# Patient Record
Sex: MALE | Race: OTHER | NOT HISPANIC OR LATINO | Employment: PART TIME | URBAN - METROPOLITAN AREA
[De-identification: names, ages, dates, MRNs, and addresses within clinical notes are randomized per-mention and may not be internally consistent; named-entity substitution may affect disease eponyms.]

---

## 2018-12-14 ENCOUNTER — HOSPITAL ENCOUNTER (EMERGENCY)
Facility: HOSPITAL | Age: 28
Discharge: HOME | End: 2018-12-15
Attending: EMERGENCY MEDICINE | Admitting: EMERGENCY MEDICINE

## 2018-12-14 VITALS
DIASTOLIC BLOOD PRESSURE: 92 MMHG | WEIGHT: 144.4 LBS | RESPIRATION RATE: 20 BRPM | HEART RATE: 115 BPM | TEMPERATURE: 97.8 F | OXYGEN SATURATION: 98 % | SYSTOLIC BLOOD PRESSURE: 151 MMHG

## 2018-12-14 DIAGNOSIS — H16.001 CORNEAL ULCERATION, RIGHT: Primary | ICD-10-CM

## 2018-12-14 DIAGNOSIS — H20.00 ACUTE IRITIS OF RIGHT EYE: ICD-10-CM

## 2018-12-14 PROCEDURE — 99282 EMERGENCY DEPT VISIT SF MDM: CPT

## 2018-12-14 RX ORDER — TETRACAINE HYDROCHLORIDE 5 MG/ML
1 SOLUTION OPHTHALMIC ONCE
Status: COMPLETED | OUTPATIENT
Start: 2018-12-14 | End: 2018-12-14

## 2018-12-14 RX ADMIN — FLUORESCEIN SODIUM 1 STRIP: 0.6 STRIP OPHTHALMIC at 22:45

## 2018-12-14 RX ADMIN — TETRACAINE HYDROCHLORIDE 1 DROP: 5 SOLUTION OPHTHALMIC at 22:45

## 2018-12-15 RX ORDER — OFLOXACIN 3 MG/ML
1 SOLUTION/ DROPS OPHTHALMIC
Qty: 5 ML | Refills: 0 | Status: SHIPPED | OUTPATIENT
Start: 2018-12-15 | End: 2018-12-21

## 2018-12-15 NOTE — ED ATTENDING ATTESTATION
Tamie Morrell MD, saw and evaluated the patient  I have discussed the patient with the resident/non-physician practitioner and agree with the resident's/non-physician practitioner's findings, Plan of Care, and MDM as documented in the resident's/non-physician practitioner's note, except where noted  All available labs and Radiology studies were reviewed  At this point I agree with the current assessment done in the Emergency Department  I have conducted an independent evaluation of this patient a history and physical is as follows:  Patient is a 30 yo male, comes with c/o right eye redness, wears contacts, slept last night with contacts on, woke up with pain, and redness in right eye, contact fell off on bed; associated with tearing  On exam, no acute distress, VSS, PERRL Right appears red, injected, Fluorescein staining shows possible corneal abrasion, iritis noted  PA will contact Ophtho, would need Antibiotic eye drops, pain meds, further evaluation as per Ophtho, close follow up, RTED for worsening symptoms      Critical Care Time  CritCare Time    Procedures

## 2018-12-15 NOTE — ED PROVIDER NOTES
History  Chief Complaint   Patient presents with    Eye Redness     Pt reports he woke up with right eye redness and "feels like some is scratching, i fell asleep with my contact in " Pt states he removed both contacts but right eye is red, painful, and reports drainage  27-year-old male presents the ER with right eye redness, irritation, photophobia, pain with movement  Patient states that he slept with his contacts in 1 night and when he woke the contact was on the pillow next to him and his eye was red and irritated  Patient states that he noticed worsening symptoms throughout the day including sensitivity to light, tearing, redness to the eye  Patient denies any eyelid swelling or discharge  Patient states that he normally goes to a contact lens center for his contact lenses  Patient denies any double vision  Patient states that he noted an area of fuzziness directly in his line of vision  Patient states that he was going his symptoms and is concerned that he will go blind  None       Past Medical History:   Diagnosis Date    Asthma        History reviewed  No pertinent surgical history  History reviewed  No pertinent family history  I have reviewed and agree with the history as documented  Social History   Substance Use Topics    Smoking status: Never Smoker    Smokeless tobacco: Not on file    Alcohol use Yes      Comment: socially         Review of Systems   Constitutional: Negative for chills and fever  Eyes: Positive for photophobia, pain, discharge (Tearing), redness and visual disturbance  Negative for itching  Respiratory: Negative for chest tightness, shortness of breath and wheezing  Cardiovascular: Negative for chest pain and palpitations  Gastrointestinal: Negative for abdominal pain, nausea and vomiting  Skin: Negative for rash  Neurological: Negative for dizziness, weakness, light-headedness, numbness and headaches     All other systems reviewed and are negative  Physical Exam  Physical Exam   Constitutional: He appears well-developed and well-nourished  He appears distressed  HENT:   Head: Normocephalic and atraumatic  Nose: Nose normal    Mouth/Throat: Uvula is midline, oropharynx is clear and moist and mucous membranes are normal    Eyes: Pupils are equal, round, and reactive to light  EOM and lids are normal  Right eye exhibits no discharge and no hordeolum  No foreign body present in the right eye  Right conjunctiva is injected  Slit lamp exam:       The right eye shows corneal ulcer  The right eye shows no corneal abrasion  Photophobia noted to right eye during exam    Neck: Normal range of motion  Cardiovascular: Regular rhythm and intact distal pulses  Tachycardia present  Pulmonary/Chest: Effort normal and breath sounds normal    Musculoskeletal: Normal range of motion  Neurological: He is alert  Skin: Skin is warm and dry  Capillary refill takes less than 2 seconds  No rash noted  He is not diaphoretic  No erythema  Nursing note and vitals reviewed  Vital Signs  ED Triage Vitals [12/14/18 2129]   Temperature Pulse Respirations Blood Pressure SpO2   97 8 °F (36 6 °C) (!) 115 20 151/92 98 %      Temp Source Heart Rate Source Patient Position - Orthostatic VS BP Location FiO2 (%)   Oral Monitor -- Right arm --      Pain Score       --           Vitals:    12/14/18 2129   BP: 151/92   Pulse: (!) 115       Visual Acuity  Visual Acuity      Most Recent Value   Visual acuity R eye is  20/25   Visual acuity Left eye is  20/20   Visual acuity in both eyes is  20/20   Wearing corrective eyewear/lenses?   Yes          ED Medications  Medications   fluorescein sodium sterile ophthalmic strip 1 strip (1 strip Right Eye Given 12/14/18 2245)   tetracaine 0 5 % ophthalmic solution 1 drop (1 drop Right Eye Given 12/14/18 2245)       Diagnostic Studies  Results Reviewed     None                 No orders to display Procedures  Procedures       Phone Contacts  ED Phone Contact    ED Course  ED Course as of Dec 24 1057   Fri Dec 14, 2018   [address] [facility] and on-call doctor Vanessa Cobb is being paged  MDM  Number of Diagnoses or Management Options  Acute iritis of right eye: new and does not require workup  Corneal ulceration, right: new and does not require workup  Diagnosis management comments: Discussed with Dr Keyana Simpson who also examined the pt and agreed with diagnosis  Called and spoke with on call doctor at the [facility] and he agreed that the pt should be placed on Ofloxacin drop Q1H while awake and pt should call the office on Monday morning to schedule a recheck  Pt information was sent by the RN  Discussed with patient that he should follow up on Monday morning and also gave patient strict return to ER instructions for any new or worsening symptoms  Patient states he understands instructions and will follow up Monday morning  Patient Progress  Patient progress: stable    CritCare Time    Disposition  Final diagnoses:   Corneal ulceration, right   Acute iritis of right eye     Time reflects when diagnosis was documented in both MDM as applicable and the Disposition within this note     Time User Action Codes Description Comment    12/15/2018 12:07 AM Elizabeth Nicolas Add [A73 415] Corneal ulceration, right     12/15/2018 12:08 AM Elizabeth Nicolas Add [H20 00] Acute iritis of right eye       ED Disposition     ED Disposition Condition Comment    Discharge  Pepe Jenkins discharge to home/self care  Condition at discharge: Stable        Follow-up Information     Follow up With Specialties Details Why [address] Sight  Call in 3 days For further evaluation of symptoms [address]  [phone]          Discharge Medication List as of 12/15/2018 12:13 AM START taking these medications    Details   ofloxacin (OCUFLOX) 0 3 % ophthalmic solution Administer 1 drop to the right eye every hour while awake for 6 days, Starting Sat 12/15/2018, Until Fri 12/21/2018, Print           No discharge procedures on file      ED Provider  Electronically Signed by           Renny Hill PA-C  12/24/18 1051

## 2018-12-15 NOTE — ED NOTES
Patient reports having worn contact lenses for 2 days continuously prior to falling asleep with them on       [address] [place] [address], RN  12/14/18 3557

## 2018-12-15 NOTE — DISCHARGE INSTRUCTIONS
Do not were contact lenses  Used drops every hour while awake  Do not rub your eyes  Can use your glasses until seen by [facility]  If any symptoms worsen or new symptoms developed return to the ER  Iritis   WHAT YOU NEED TO KNOW:   Iritis is inflammation of your iris  The iris is the colored part of your eye  DISCHARGE INSTRUCTIONS:   Follow up with your healthcare provider or ophthalmologist within 24 hours:  Write down your questions so you remember to ask them during your visits  Medicines:   · Cycloplegics: These eyedrops dilate your pupil and relax your eye muscles  This helps decrease pain and light sensitivity  · Steroids: These eyedrops help decrease pain and inflammation  · Acetaminophen: This medicine decreases pain  You can buy acetaminophen without a doctor's order  Ask how much to take and how often to take it  Follow directions  Acetaminophen can cause liver damage if not taken correctly  · Take your medicine as directed  Contact your healthcare provider if you think your medicine is not helping or if you have side effects  Tell him of her if you are allergic to any medicine  Keep a list of the medicines, vitamins, and herbs you take  Include the amounts, and when and why you take them  Bring the list or the pill bottles to follow-up visits  Carry your medicine list with you in case of an emergency  Manage your symptoms:   · Apply a warm compress to your eye:  Wet a washcloth in warm water and wring it out  Place it gently over your eye for 20 minutes 3 to 4 times each day  This will help soothe your eye and decrease inflammation  · Wear dark sunglasses: This will help prevent pain and light sensitivity  Contact your healthcare provider or ophthalmologist if:   · Your pain gets worse, even after treatment  · You see halos or rainbows around lights  · You have questions or concerns about your condition or care    Return to the emergency department if:   · You have severe eye pain and a headache  · Your vision suddenly gets worse  · You have nausea or vomiting  © 2017 [address] [employer] Information is for End User's use only and may not be sold, redistributed or otherwise used for commercial purposes  All illustrations and images included in CareNotes® are the copyrighted property of A D A M , Inc  or [employer] M [employer]  The above information is an  only  It is not intended as medical advice for individual conditions or treatments  Talk to your doctor, nurse or pharmacist before following any medical regimen to see if it is safe and effective for you  Corneal Ulcer   WHAT YOU NEED TO KNOW:   A corneal ulcer is an open sore on your cornea  The cornea is the smooth, clear outer layer of your eye  A corneal ulcer is caused by bacteria that get into your eye, such as through a scratch  DISCHARGE INSTRUCTIONS:   Medicines:   · NSAIDs:  These medicines decrease swelling, pain, and fever  NSAIDs are available without a doctor's order  Ask your healthcare provider which medicine is right for you  Ask how much to take and when to take it  Take as directed  NSAIDs can cause stomach bleeding and kidney problems if not taken correctly  · Antibiotic eye medicine: This is given to treat an infection caused by bacteria  It may be in eyedrops or an ointment  · Cycloplegic eye medicine: This will dilate your pupil and relax your eye muscles, which will decrease your pain  · Pain medicines: You may be given prescription medicine to take away or decrease pain  Do not wait until the pain is severe before you take your medicine  · Take your medicine as directed  Contact your healthcare provider if you think your medicine is not helping or if you have side effects  Tell him of her if you are allergic to any medicine  Keep a list of the medicines, vitamins, and herbs you take   Include the amounts, and when and why you take them  Bring the list or the pill bottles to follow-up visits  Carry your medicine list with you in case of an emergency  Follow up with your healthcare provider or eye specialist within 24 hours: You may need to see your eye specialist every 1 to 3 days if your condition is severe  Write down your questions so you remember to ask them during your visits  Manage your symptoms:   · Apply a warm compress:  Wet a washcloth with warm water and place it on your eye  This will help decrease swelling and pain  Use as often as directed  · Clean around your eye:  Gently remove any crusty buildup around your eye  · Use eyedrops: This will keep your eyes moist and help decrease pain  · Use safety equipment:  Wear sunglasses or safety goggles to avoid another injury  · Ask about your contacts:  Do not wear contact lenses until your healthcare provider says it is okay  Always clean your contact lenses with proper contact   Contact your healthcare provider or eye specialist if:   · Your vision gets worse  · Your symptoms do not improve with treatment  · You have questions or concerns about your condition or care  Return to the emergency department if:   · You have severe eye pain  · You lose your vision  · You think your corneal ulcer is getting bigger  · You injure your eye again  © 2017 [address]  Information is for End User's use only and may not be sold, redistributed or otherwise used for commercial purposes  All illustrations and images included in CareNotes® are the copyrighted property of [employer]  or Luis M Dewitt  The above information is an  only  It is not intended as medical advice for individual conditions or treatments  Talk to your doctor, nurse or pharmacist before following any medical regimen to see if it is safe and effective for you

## 2024-05-07 ENCOUNTER — APPOINTMENT (EMERGENCY)
Dept: RADIOLOGY | Facility: HOSPITAL | Age: 34
End: 2024-05-07

## 2024-05-07 ENCOUNTER — HOSPITAL ENCOUNTER (EMERGENCY)
Facility: HOSPITAL | Age: 34
Discharge: HOME | End: 2024-05-07
Attending: EMERGENCY MEDICINE

## 2024-05-07 VITALS
OXYGEN SATURATION: 98 % | HEART RATE: 102 BPM | WEIGHT: 144.4 LBS | TEMPERATURE: 99.3 F | RESPIRATION RATE: 18 BRPM | DIASTOLIC BLOOD PRESSURE: 75 MMHG | SYSTOLIC BLOOD PRESSURE: 130 MMHG

## 2024-05-07 DIAGNOSIS — M79.671 RIGHT FOOT PAIN: ICD-10-CM

## 2024-05-07 DIAGNOSIS — M54.31 SCIATICA, RIGHT SIDE: ICD-10-CM

## 2024-05-07 DIAGNOSIS — G57.01 PYRIFORMIS SYNDROME, RIGHT: Primary | ICD-10-CM

## 2024-05-07 PROCEDURE — 96372 THER/PROPH/DIAG INJ SC/IM: CPT

## 2024-05-07 PROCEDURE — 73630 X-RAY EXAM OF FOOT: CPT

## 2024-05-07 PROCEDURE — 99284 EMERGENCY DEPT VISIT MOD MDM: CPT | Performed by: EMERGENCY MEDICINE

## 2024-05-07 PROCEDURE — 99283 EMERGENCY DEPT VISIT LOW MDM: CPT

## 2024-05-07 RX ORDER — LIDOCAINE 50 MG/G
1 PATCH TOPICAL ONCE
Status: DISCONTINUED | OUTPATIENT
Start: 2024-05-07 | End: 2024-05-07 | Stop reason: HOSPADM

## 2024-05-07 RX ORDER — NAPROXEN 250 MG/1
250 TABLET ORAL
Qty: 21 TABLET | Refills: 0 | Status: SHIPPED | OUTPATIENT
Start: 2024-05-07 | End: 2024-05-14

## 2024-05-07 RX ORDER — METHOCARBAMOL 750 MG/1
750 TABLET, FILM COATED ORAL ONCE
Status: COMPLETED | OUTPATIENT
Start: 2024-05-07 | End: 2024-05-07

## 2024-05-07 RX ORDER — KETOROLAC TROMETHAMINE 30 MG/ML
15 INJECTION, SOLUTION INTRAMUSCULAR; INTRAVENOUS ONCE
Status: COMPLETED | OUTPATIENT
Start: 2024-05-07 | End: 2024-05-07

## 2024-05-07 RX ORDER — LIDOCAINE 40 MG/G
CREAM TOPICAL AS NEEDED
Qty: 30 G | Refills: 0 | Status: SHIPPED | OUTPATIENT
Start: 2024-05-07

## 2024-05-07 RX ORDER — METHOCARBAMOL 500 MG/1
500 TABLET, FILM COATED ORAL 2 TIMES DAILY
Qty: 20 TABLET | Refills: 0 | Status: SHIPPED | OUTPATIENT
Start: 2024-05-07

## 2024-05-07 RX ORDER — METHYLPREDNISOLONE 4 MG/1
TABLET ORAL
Qty: 21 TABLET | Refills: 0 | Status: SHIPPED | OUTPATIENT
Start: 2024-05-07

## 2024-05-07 RX ADMIN — METHOCARBAMOL 750 MG: 750 TABLET ORAL at 20:50

## 2024-05-07 RX ADMIN — KETOROLAC TROMETHAMINE 15 MG: 30 INJECTION, SOLUTION INTRAMUSCULAR; INTRAVENOUS at 20:50

## 2024-05-07 RX ADMIN — LIDOCAINE 5% 1 PATCH: 700 PATCH TOPICAL at 20:50

## 2024-05-07 NOTE — Clinical Note
Pepe Jenkins was seen and treated in our emergency department on 5/7/2024.    No restrictions            Diagnosis:     Pepe  may return to work on return date.    He may return on this date: 05/09/2024         If you have any questions or concerns, please don't hesitate to call.      Sriram Ocasio MD    ______________________________           _______________          _______________  Hospital Representative                              Date                                Time

## 2024-05-08 ENCOUNTER — TELEPHONE (OUTPATIENT)
Age: 34
End: 2024-05-08

## 2024-05-08 NOTE — ED PROVIDER NOTES
History  Chief Complaint   Patient presents with    Back Pain     Pt reports right-sided lower back pain with shooting pains down leg. Also reports burning sensation in R foot and difficulty with ambulation. Been going on approx 3-4 weeks. Denies known injury. Reports does a lot of moving around at work.     Foot Pain     Burning sensation in R foot with increased pain when putting pressure on it to walk.     33-year-old male presents for evaluation of 2 and half weeks pain in his right buttocks that radiates into his right foot.  Pain is constant, worse with positional changes, sitting for too long.  Associated with a burning sensation in the distal portion of his right foot and right great toe.  That is been present for 4 weeks.  No recent falls or back trauma, fevers, chills, history of IV drug use, abdominal pain, bowel/bladder complaints, saddle anesthesia.      History provided by:  Patient  Back Pain  Associated symptoms: no abdominal pain, no chest pain, no dysuria, no fever, no numbness and no weakness        None       Past Medical History:   Diagnosis Date    Asthma        History reviewed. No pertinent surgical history.    History reviewed. No pertinent family history.  I have reviewed and agree with the history as documented.    E-Cigarette/Vaping     E-Cigarette/Vaping Substances    Nicotine No     THC No     CBD No     Flavoring No     Other No     Unknown No      Social History     Tobacco Use    Smoking status: Never   Substance Use Topics    Alcohol use: Yes     Comment: socially     Drug use: Yes     Types: Marijuana       Review of Systems   Constitutional:  Negative for activity change, appetite change, fatigue and fever.   HENT:  Negative for congestion, dental problem, ear pain, rhinorrhea and sore throat.    Eyes:  Negative for pain and redness.   Respiratory:  Negative for chest tightness, shortness of breath and wheezing.    Cardiovascular:  Negative for chest pain and palpitations.    Gastrointestinal:  Negative for abdominal pain, blood in stool, constipation, diarrhea, nausea and vomiting.   Endocrine: Negative for cold intolerance and heat intolerance.   Genitourinary:  Negative for dysuria, frequency and hematuria.   Musculoskeletal:  Positive for back pain. Negative for arthralgias and myalgias.   Skin:  Negative for color change, pallor and rash.   Neurological:  Negative for weakness and numbness.   Hematological:  Does not bruise/bleed easily.   Psychiatric/Behavioral:  Negative for agitation, hallucinations and suicidal ideas.        Physical Exam  Physical Exam  Vitals and nursing note reviewed.   Constitutional:       Appearance: He is well-developed.   HENT:      Mouth/Throat:      Pharynx: No oropharyngeal exudate.   Eyes:      Conjunctiva/sclera: Conjunctivae normal.   Neck:      Comments: No meningeal signs  Cardiovascular:      Rate and Rhythm: Normal rate and regular rhythm.      Heart sounds: Normal heart sounds.   Pulmonary:      Effort: Pulmonary effort is normal. No respiratory distress.      Breath sounds: Normal breath sounds. No wheezing or rales.   Chest:      Chest wall: No tenderness.   Abdominal:      General: Bowel sounds are normal. There is no distension.      Palpations: Abdomen is soft. There is no mass.      Tenderness: There is no abdominal tenderness.      Hernia: No hernia is present.      Comments: No cvat   Musculoskeletal:         General: Normal range of motion.      Cervical back: Normal range of motion and neck supple.      Comments: Nttp over ctl spines, +ttp R pyriformis, +SLR RLE, NVI b/l le, R foot with out swelling, redness, warmth, +ttp RIght first metatarsal phalageal joint   Lymphadenopathy:      Cervical: No cervical adenopathy.   Skin:     Findings: No erythema or rash.      Comments: No edema   Neurological:      Mental Status: He is alert and oriented to person, place, and time.      Cranial Nerves: No cranial nerve deficit.   Psychiatric:          Behavior: Behavior normal.         Vital Signs  ED Triage Vitals   Temperature Pulse Respirations Blood Pressure SpO2   05/07/24 1936 05/07/24 1936 05/07/24 1936 05/07/24 1936 05/07/24 1936   99.3 °F (37.4 °C) (!) 121 18 130/75 98 %      Temp Source Heart Rate Source Patient Position - Orthostatic VS BP Location FiO2 (%)   05/07/24 1936 05/07/24 1936 05/07/24 1936 05/07/24 1936 --   Oral Monitor Sitting Right arm       Pain Score       05/07/24 2050       9           Vitals:    05/07/24 1936 05/07/24 2129   BP: 130/75    Pulse: (!) 121 102   Patient Position - Orthostatic VS: Sitting          Visual Acuity      ED Medications  Medications   lidocaine (LIDODERM) 5 % patch 1 patch (1 patch Topical Medication Applied 5/7/24 2050)   ketorolac (TORADOL) injection 15 mg (15 mg Intramuscular Given 5/7/24 2050)   methocarbamol (ROBAXIN) tablet 750 mg (750 mg Oral Given 5/7/24 2050)       Diagnostic Studies  Results Reviewed       None                   XR foot 3+ views RIGHT   ED Interpretation by Sriram Ocasio MD (05/07 2129)   Primary reviewed: no acute abnormality                 Procedures  Procedures         ED Course  ED Course as of 05/07/24 2131   Tue May 07, 2024   2129 Xray neg for acute fracture, will tx for sciatia, pcp/pods referral for R foot pain                                 SBIRT 20yo+      Flowsheet Row Most Recent Value   Initial Alcohol Screen: US AUDIT-C     1. How often do you have a drink containing alcohol? 0 Filed at: 05/07/2024 1957   2. How many drinks containing alcohol do you have on a typical day you are drinking?  0 Filed at: 05/07/2024 1957   3a. Male UNDER 65: How often do you have five or more drinks on one occasion? 0 Filed at: 05/07/2024 1957   Audit-C Score 0 Filed at: 05/07/2024 1957   ADRIANA: How many times in the past year have you...    Used an illegal drug or used a prescription medication for non-medical reasons? Never Filed at: 05/07/2024 1957                       Medical Decision Making  R Foot pain w/o hx exam findings to suggest infecious etiology/gout, will do xray to r/o fx/dislocation, tx symptoms    R sided low back pain radiating down the R leg w/o hx/exam findings to suggest infectious etiology/cauda equina and medical work up is not inidcatied likely pyriformis syndrome w/ sciatica, will reassure, , tx symptoms     Amount and/or Complexity of Data Reviewed  Radiology: ordered and independent interpretation performed.    Risk  OTC drugs.  Prescription drug management.             Disposition  Final diagnoses:   Pyriformis syndrome, right   Sciatica, right side   Right foot pain     Time reflects when diagnosis was documented in both MDM as applicable and the Disposition within this note       Time User Action Codes Description Comment    5/7/2024  9:30 PM Sriram Ocasio [G57.01] Pyriformis syndrome, right     5/7/2024  9:30 PM Sriram Ocasio [M54.31] Sciatica, right side     5/7/2024  9:30 PM Sriram Ocasio Add [M79.671] Right foot pain           ED Disposition       ED Disposition   Discharge    Condition   Stable    Date/Time   Tue May 7, 2024 2129    Comment   Pepe Jenkins discharge to home/self care.                   Follow-up Information       Follow up With Specialties Details Why Contact Info Additional Information    [facility] Schedule an appointment as soon as possible for a visit in 2 days  [address] [place] [address] [phone] [facility], [address] [place] [address], [address] [phone]    [facility] Schedule an appointment as soon as possible for a visit in 2 days  [address] [id number] [phone] [facility] [place], [facility] [address], [place], [address] [phone]             Patient's Medications   Discharge Prescriptions    LIDOCAINE (LMX) 4 % CREAM    Apply topically as needed for moderate pain       Start Date: 5/7/2024  End Date: --       Order Dose: --       Quantity: 30 g    Refills: 0    METHOCARBAMOL (ROBAXIN) 500 MG TABLET    Take 1 tablet (500 mg total) by mouth 2 (two) times a day       Start Date: 5/7/2024  End Date: --       Order Dose: 500 mg       Quantity: 20 tablet    Refills: 0    METHYLPREDNISOLONE 4 MG TABLET THERAPY PACK    Use as directed on package       Start Date: 5/7/2024  End Date: --       Order Dose: --       Quantity: 21 tablet    Refills: 0    NAPROXEN (NAPROSYN) 250 MG TABLET    Take 1 tablet (250 mg total) by mouth 3 (three) times a day with meals for 7 days       Start Date: 5/7/2024  End Date: 5/14/2024       Order Dose: 250 mg       Quantity: 21 tablet    Refills: 0       No discharge procedures on file.    PDMP Review       None            ED Provider  Electronically Signed by             Sriram Ocasio MD  05/07/24 3353

## 2024-05-08 NOTE — TELEPHONE ENCOUNTER
Caller: Pepe Jenkins    Doctor: Jacob    Reason for call: Patient is not able to put any weight on  his right great toe and the pain goes up his leg.  Is there any way we can do a force on for him?  Thank you.     Call back#: [phone]

## 2024-05-09 ENCOUNTER — TELEPHONE (OUTPATIENT)
Dept: FAMILY MEDICINE CLINIC | Facility: CLINIC | Age: 34
End: 2024-05-09

## 2024-05-28 ENCOUNTER — TELEPHONE (OUTPATIENT)
Dept: FAMILY MEDICINE CLINIC | Facility: CLINIC | Age: 34
End: 2024-05-28

## 2024-05-28 NOTE — TELEPHONE ENCOUNTER
first attempt to contact patient. no answer left message to return my call on answering machine      Need to reschedule no show genie

## 2025-04-11 ENCOUNTER — TELEPHONE (OUTPATIENT)
Age: 35
End: 2025-04-11

## 2025-04-11 NOTE — TELEPHONE ENCOUNTER
Confirmed patient would need to get workman's comp information prior to scheduling appt.  Patient going to call back with information.

## 2025-04-14 ENCOUNTER — OFFICE VISIT (OUTPATIENT)
Dept: OBGYN CLINIC | Facility: MEDICAL CENTER | Age: 35
End: 2025-04-14
Payer: OTHER MISCELLANEOUS

## 2025-04-14 VITALS — HEIGHT: 70 IN | WEIGHT: 152 LBS | BODY MASS INDEX: 21.76 KG/M2

## 2025-04-14 DIAGNOSIS — M67.911 ROTATOR CUFF DYSFUNCTION, RIGHT: ICD-10-CM

## 2025-04-14 DIAGNOSIS — M25.511 ACUTE PAIN OF RIGHT SHOULDER: Primary | ICD-10-CM

## 2025-04-14 PROCEDURE — 99204 OFFICE O/P NEW MOD 45 MIN: CPT | Performed by: ORTHOPAEDIC SURGERY

## 2025-04-14 RX ORDER — PREDNISONE 10 MG/1
TABLET ORAL
Qty: 45 TABLET | Refills: 0 | Status: SHIPPED | OUTPATIENT
Start: 2025-04-14

## 2025-04-14 NOTE — LETTER
April 14, 2025     Patient: Pepe Jenkins  YOB: 1990  Date of Visit: 4/14/2025      To Whom it May Concern:    Pepe Jenkins is under my professional care. Pepe was seen in my office on 4/14/2025.  The patient may work sedentary duty only until follow up in 4 weeks.      If you have any questions or concerns, please don't hesitate to call.          Sincerely,          Juni Gloria MD        CC: No Recipients

## 2025-04-14 NOTE — PROGRESS NOTES
Orthopaedic Surgery - Office Note  Pepe Jenkins (34 y.o. male)   : 1990   MRN: [id number]  Encounter Date: 2025    Assessment / Plan     Partial supraspinatus tear, superior labral tear concer for frozen shoulder versus cervical strain with radiculopathy    We discussed similarities and differences between cervical radiculopathy versus rotator cuff/labral tear  The patient had exquisite tenderness to light touch on exam.   We discussed primary goa would be to decrease pain to achieve a proper physical exam in which would further guide care  Re-start physical therapy  Oral steroid taper prescribed   Work note:  The patient may work sedentary duty only until follow up in 4 weeks.    Follow-up:  No follow-ups on file.      Chief Complaint / Date of Onset  Right shoulder pain  Injury Mechanism / Date  Throwing item at work  Surgery / Date  None    History of Present Illness   Pepe Jenkins is a 34 y.o. male who presents for for initial evaluation of right shoulder.  He had previously seen  including right subacromial steroid injection with short-lived benefit.  He has had symptoms for about 3 months with injury at work.  He lifted an item and threw it into another area with onset of symptoms with audible popping sound.  Today he complains of neck pain, generalized anterior and posterior shoulder pain, axillary pain with proximal biceps pain, distal hand numbness and sensation of arm hanging from torso.  He rates his pain from 8-10/10.  Any movement or leaning over aggravates while rest alleviates.  He has used Naproxen, Aleve and ASA for pain.  He has done physical therapy with increase of symptoms.  He denies past shoulder injections or surgery.   He works at [employer].    Treatment Summary  Medications / Modalities  Naproxen  Bracing / Immobilization  None  Physical Therapy  Yes, with increase of pain  Injections  Right subacromial Genie HORNE  Prior Surgeries  None  Other  "Treatments  None    Employment / Current Status  [employer]     Sport / Organization / Current Status  N/A      Review of Systems  Pertinent items are noted in HPI.  All other systems were reviewed and are negative.      Physical Exam  Ht 5' 10\" (1.778 m)   Wt 68.9 kg (152 lb)   BMI 21.81 kg/m²   Cons: Appears well.  No apparent distress.  Psych: Alert. Oriented x3.  Mood and affect normal.  Eyes: PERRLA, EOMI  Resp: Normal effort.  No audible wheezing or stridor.  CV: Palpable pulse.  No discernable arrhythmia.  No LE edema.  Lymph:  No palpable cervical, axillary, or inguinal lymphadenopathy.  Skin: Warm.  No palpable masses.  No visible lesions.  Neuro: Normal muscle tone.  Normal and symmetric DTR's.     Right Shoulder Exam  Alignment / Posture: Normal shoulder posture.  Inspection: No swelling. No ecchymosis.  Palpation: Patient exquisitely tender to light touch.  No effusion.  ROM: Shoulder FF AROM 100 . Shoulder ER 30. Shoulder IR sacrum.  Strength: Able to actively flex & extend elbow.  Pain with resisted ER 4/5  Stability: No objective shoulder instability.  Tests: not tested  Neurovascular: Sensation intact in Ax/R/M/U nerve distributions. 2+ radial pulse.     Negative bilateral Spurlings      Studies Reviewed  Right shoulder MRI of 2/19/2025:  Superior labral lesion.  High-grade partial supraspinatus.      Procedures  No procedures today.    Medical, Surgical, Family, and Social History  The patient's medical history, family history, and social history, were reviewed and updated as appropriate.    Past Medical History:   Diagnosis Date    Asthma        History reviewed. No pertinent surgical history.    History reviewed. No pertinent family history.    Social History     Occupational History    Not on file   Tobacco Use    Smoking status: Never    Smokeless tobacco: Not on file   Vaping Use    Vaping status: Not on file   Substance and Sexual Activity    Alcohol use: Yes     Comment: socially "     Drug use: Yes     Types: Marijuana    Sexual activity: Not on file       Allergies   Allergen Reactions    Pork (Porcine) Protein - Food Allergy Itching    Shellfish-Derived Products - Food Allergy Itching         Current Outpatient Medications:     lidocaine (LMX) 4 % cream, Apply topically as needed for moderate pain, Disp: 30 g, Rfl: 0    methylPREDNISolone 4 MG tablet therapy pack, Use as directed on package, Disp: 21 tablet, Rfl: 0    naproxen (NAPROSYN) 250 mg tablet, Take 1 tablet (250 mg total) by mouth 3 (three) times a day with meals for 7 days, Disp: 21 tablet, Rfl: 0    methocarbamol (ROBAXIN) 500 mg tablet, Take 1 tablet (500 mg total) by mouth 2 (two) times a day (Patient not taking: Reported on 4/14/2025), Disp: 20 tablet, Rfl: 0      Jony Ramey MA    Scribe Attestation      I,:  Jony Ramey MA am acting as a scribe while in the presence of the attending physician.:       I,:  Juni Gloria MD personally performed the services described in this documentation    as scribed in my presence.:

## 2025-04-14 NOTE — LETTER
April 14, 2025     Patient: Pepe Jenkins  YOB: 1990  Date of Visit: 4/14/2025      To Whom it May Concern:    Pepe Jenkins is under my professional care. Pepe was seen in my office on 4/14/2025.  The patient should not work until follow up in about 4 weeks.      If you have any questions or concerns, please don't hesitate to call.          Sincerely,          Juni Gloria MD        CC: No Recipients

## 2025-04-15 ENCOUNTER — TELEPHONE (OUTPATIENT)
Dept: OBGYN CLINIC | Facility: HOSPITAL | Age: 35
End: 2025-04-15

## 2025-04-15 NOTE — TELEPHONE ENCOUNTER
Caller: Pepe    Doctor: Dr. Gloria/Joseph    Reason for call: Patient's job stating that his work note needs to be more detailed. Currently it says sedentary duty only until 4 week fu.    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      Please send copy to his [employer], so he can email it to his company. Please call patient he would also like to come in to  a physical copy of the note as well.     Call back#: [phone]

## 2025-04-16 NOTE — TELEPHONE ENCOUNTER
Please let the patient know that I did put an updated note with those restrictions that will be in place to his next visit.  It is in my chart and he can also come to pick it up.

## 2025-04-25 NOTE — TELEPHONE ENCOUNTER
Caller: Judie from [facility]     Doctor: Dr. Gloria     Reason for call: electronically faxed 4/14 OVN to [phone]     Call back#: [phone]

## 2025-04-30 ENCOUNTER — OFFICE VISIT (OUTPATIENT)
Dept: OBGYN CLINIC | Facility: OTHER | Age: 35
End: 2025-04-30
Payer: OTHER MISCELLANEOUS

## 2025-04-30 ENCOUNTER — TELEPHONE (OUTPATIENT)
Age: 35
End: 2025-04-30

## 2025-04-30 VITALS — HEIGHT: 70 IN | WEIGHT: 152 LBS | BODY MASS INDEX: 21.76 KG/M2

## 2025-04-30 DIAGNOSIS — M25.511 ACUTE PAIN OF RIGHT SHOULDER: Primary | ICD-10-CM

## 2025-04-30 PROCEDURE — 99213 OFFICE O/P EST LOW 20 MIN: CPT | Performed by: ORTHOPAEDIC SURGERY

## 2025-04-30 NOTE — TELEPHONE ENCOUNTER
Caller: Liana from [facility]    Doctor: Dr. Gloria    Reason for call: Liana calling stating that patient has been attending PT with them and patient is progressing OK with PT but she is concerned with positive sulcus sign and would like to discuss what the next steps would be with PT.  Liana can be reached at number below.      Call back#: [phone]

## 2025-04-30 NOTE — PROGRESS NOTES
Orthopaedic Surgery - Office Note  Pepe Jenkins (34 y.o. male)   : 1990   MRN: [id number]  Encounter Date: 2025    Assessment / Plan     Partial supraspinatus tear, superior labral tear concern for frozen shoulder versus cervical strain with radiculopathy    Due to the patient's feeling of instability with current limited exam due to pain we would like to obtain an MRI arthrogram of the patient's right shoulder to further evaluate for labral tearing.  Hold physical therapy at this time due to the patient's worsening pain and symptomology.  Continue with ice and analgesics/NSAIDs as needed for pain.  Work note: Continue with current work restrictions of sedentary duty at this time.    Follow-up:  Return for follow up after MRI arthrogram R shoulder with Dr. Gloria.      Chief Complaint / Date of Onset  Right shoulder pain  Injury Mechanism / Date  Throwing item at work  Surgery / Date  None    History of Present Illness   Pepe Jenkins is a 34 y.o. male following up for evaluation of his right shoulder.  Since last visit he has started physical therapy which has helped him improve some swelling and slightly his range of motion.  Since last visit and starting to move it more though he is had the feeling of instability with his shoulder slipping out the front.  He states that this is the same as a sensation he felt when the initial injury occurred.  After physical therapy he feels that his pain and swelling do increase for at least a day.  He has been on light duty at work which included sedentary work that did not involve his right arm. He had previously seen  including right subacromial steroid injection with short-lived benefit.  He has had symptoms for about 3 months with injury at work.  He lifted an item and threw it into another area with onset of symptoms with audible popping sound and a sensation of instability.  Today he continues to complain of generalized anterior and posterior  "shoulder pain.  He rates his pain from 8-10/10 with movement.  Any movement or leaning over aggravates while rest alleviates.  He has used Naproxen, Aleve and ASA for pain.  He has done physical therapy with increase of symptoms.  He denies past shoulder injections or surgery.   He works at [employer].    Treatment Summary  Medications / Modalities  Naproxen  Bracing / Immobilization  None  Physical Therapy  Yes, with increase of pain and feeling of instability.  Physical therapist did notice a sulcus sign on exam.  Injections  Right subacromial CS, Genie  Prior Surgeries  None  Other Treatments  None    Employment / Current Status  [employer]     Sport / Organization / Current Status  N/A      Review of Systems  Pertinent items are noted in HPI.  All other systems were reviewed and are negative.      Physical Exam  Ht 5' 10\" (1.778 m)   Wt 68.9 kg (152 lb)   BMI 21.81 kg/m²   Cons: Appears well.  No apparent distress.  Psych: Alert. Oriented x3.  Mood and affect normal.  Eyes: PERRLA, EOMI  Resp: Normal effort.  No audible wheezing or stridor.  CV: Palpable pulse.  No discernable arrhythmia.  No LE edema.  Lymph:  No palpable cervical, axillary, or inguinal lymphadenopathy.  Skin: Warm.  No palpable masses.  No visible lesions.  Neuro: Normal muscle tone.  Normal and symmetric DTR's.     Right Shoulder Exam  Alignment / Posture:  Normal cervical alignment. Normal shoulder posture. Normal scapular position.  Inspection:  No swelling. No muscle atrophy. No deformity.  Palpation:   Moderate tenderness at anterior aspect of the shoulder/subacromial space.  ROM:  Shoulder FE 70 with feeling of instability past this both actively and passively. Shoulder ER 60 with feeling of instability past this. Shoulder IR SI joint on the right side.  Strength:   We are unable to assess strength testing at this time due to the patient's pain and limited range of motion.  Stability:  (+) Apprehension. (+) " Relocation.  Tests: (-) Spurling.  Neurovascular:  Sensation intact in Ax/R/M/U nerve distributions. Sensation intact in all digital nerve distributions. Fingers warm and perfused.       Studies Reviewed  Right shoulder MRI of 2/19/2025:  Superior labral lesion.  High-grade partial supraspinatus.      Procedures  No procedures today.    Medical, Surgical, Family, and Social History  The patient's medical history, family history, and social history, were reviewed and updated as appropriate.    Past Medical History:   Diagnosis Date    Asthma        History reviewed. No pertinent surgical history.    History reviewed. No pertinent family history.    Social History     Occupational History    Not on file   Tobacco Use    Smoking status: Never    Smokeless tobacco: Not on file   Vaping Use    Vaping status: Not on file   Substance and Sexual Activity    Alcohol use: Yes     Comment: socially     Drug use: Yes     Types: Marijuana    Sexual activity: Not on file       Allergies   Allergen Reactions    Pork (Porcine) Protein - Food Allergy Itching    Shellfish-Derived Products - Food Allergy Itching         Current Outpatient Medications:     lidocaine (LMX) 4 % cream, Apply topically as needed for moderate pain, Disp: 30 g, Rfl: 0    methylPREDNISolone 4 MG tablet therapy pack, Use as directed on package, Disp: 21 tablet, Rfl: 0    methocarbamol (ROBAXIN) 500 mg tablet, Take 1 tablet (500 mg total) by mouth 2 (two) times a day (Patient not taking: Reported on 4/30/2025), Disp: 20 tablet, Rfl: 0    naproxen (NAPROSYN) 250 mg tablet, Take 1 tablet (250 mg total) by mouth 3 (three) times a day with meals for 7 days, Disp: 21 tablet, Rfl: 0    predniSONE 10 mg tablet, Take 5 pills/day for 3 days, 4 pills/day for 3 days, 3 pills/day for 3 days, 2 pills/day for 3 days, one pill/day for 3 days (Patient not taking: Reported on 4/30/2025), Disp: 45 tablet, Rfl: 0      Liam Almaguer PA-C    Scribmoon Attestation      I,:   am  acting as a scribe while in the presence of the attending physician.:       I,:   personally performed the services described in this documentation    as scribed in my presence.:

## 2025-04-30 NOTE — LETTER
April 30, 2025     Patient: Pepe Jenkins  YOB: 1990  Date of Visit: 4/30/2025      To Whom it May Concern:    Pepe Jenkins is under my professional care. Pepe was seen in my office on 4/30/2025. At this time we are recommending the patient continue on sedentary duty including no lifting, no reaching, no bending, no pushing or pulling, until he is seen back for reevaluation on 5/12/2025 or sooner if MRI arthrogram available at that time. At that time he will be reevaluated for updated restrictions. He currently is allowed to work with these restrictions for 8 hours a day.     If you have any questions or concerns, please don't hesitate to call.          Sincerely,          Juni Gloria MD        CC: No Recipients

## 2025-04-30 NOTE — TELEPHONE ENCOUNTER
I did contact Liana and tell her that we are holding physical therapy for him at this time until after the MRI arthrogram.

## 2025-05-08 NOTE — NURSING NOTE
Unable to reach patient, sent instructions and directions in e-mail that is linked to this account and via epic my chart. See message below.  Upcoming Radiology appointment at [facility]  Good afternoon Mr. Jenkins,    You have an upcoming appointment here at [facility] on 4/20/25 @ 1 pm  for a right shoulder MRI arthrogram  utilizing Fluoroscopy (x-ray) guidance . Please arrive 15 minutes prior to your appointment time.    [facility] radiology is located at [address] [place] [address] B. Present yourself to admission services that is located on the ground floor to the left of the information desk in Building B to register for your test.. Please sign in using the Kiosk (if any issues with your registration, an admission personnel will assist you). Once registered you can go up to the 1st floor - radiology department (it will be to the right at the main corridor on the 1st floor).      You will have your Fluoro guided procedure of injecting the contrast that will be seen in the MRI directly into your right shoulder then be guided to our MRI suite for your images.     For this test you may eat, drink, take all your usual medications, including aspirin should you take this medication. In regard to driving - if you are not taking any medication for anti-anxiety for the procedure you may drive yourself. BUT  if you are taking medications for anxiety (Xanax, Ativan etc.)  for the procedure you will need a .           These both appointments will be approximately about 2 hours in total.    If you have any questions or concerns regarding the above information,  please feel free to send me a response via a call, email or Populr chart.      If we have not spoken yet and understand the above information and have no further questions or concerns can you please send me a response via a call, email or Populr chart that you have received and understood the  information.     May you have a good day,   Mary Anne Layton RN  [facility] Radiology RN  [address] [place] [address]  [phone] (Office)  [phone] (Fax)  [email or web address]

## 2025-05-20 ENCOUNTER — HOSPITAL ENCOUNTER (OUTPATIENT)
Dept: RADIOLOGY | Facility: HOSPITAL | Age: 35
Discharge: HOME | End: 2025-05-20
Attending: PHYSICIAN ASSISTANT
Payer: OTHER MISCELLANEOUS

## 2025-05-20 DIAGNOSIS — M25.511 ACUTE PAIN OF RIGHT SHOULDER: ICD-10-CM

## 2025-05-20 PROCEDURE — 23350 INJECTION FOR SHOULDER X-RAY: CPT

## 2025-05-20 PROCEDURE — A9585 GADOBUTROL INJECTION: HCPCS | Performed by: PHYSICIAN ASSISTANT

## 2025-05-20 PROCEDURE — 73222 MRI JOINT UPR EXTREM W/DYE: CPT

## 2025-05-20 PROCEDURE — 77002 NEEDLE LOCALIZATION BY XRAY: CPT

## 2025-05-20 RX ORDER — SODIUM CHLORIDE 9 MG/ML
50 INJECTION INTRAVENOUS
Status: DISCONTINUED | OUTPATIENT
Start: 2025-05-20 | End: 2025-05-21 | Stop reason: HOSPADM

## 2025-05-20 RX ORDER — LIDOCAINE HYDROCHLORIDE 10 MG/ML
5 INJECTION, SOLUTION EPIDURAL; INFILTRATION; INTRACAUDAL; PERINEURAL
Status: DISCONTINUED | OUTPATIENT
Start: 2025-05-20 | End: 2025-05-21 | Stop reason: HOSPADM

## 2025-05-20 RX ORDER — ROPIVACAINE HYDROCHLORIDE 2 MG/ML
10 INJECTION, SOLUTION EPIDURAL; INFILTRATION; PERINEURAL ONCE
Status: DISCONTINUED | OUTPATIENT
Start: 2025-05-20 | End: 2025-05-21 | Stop reason: HOSPADM

## 2025-05-20 RX ORDER — GADOBUTROL 604.72 MG/ML
2 INJECTION INTRAVENOUS
Status: COMPLETED | OUTPATIENT
Start: 2025-05-20 | End: 2025-05-20

## 2025-05-20 RX ADMIN — GADOBUTROL 0.2 ML: 604.72 INJECTION INTRAVENOUS at 13:46

## 2025-05-20 RX ADMIN — IOHEXOL 1 ML: 300 INJECTION, SOLUTION INTRAVENOUS at 13:46

## 2025-05-23 ENCOUNTER — OFFICE VISIT (OUTPATIENT)
Dept: OBGYN CLINIC | Facility: MEDICAL CENTER | Age: 35
End: 2025-05-23

## 2025-05-23 VITALS — BODY MASS INDEX: 22.33 KG/M2 | HEIGHT: 70 IN | WEIGHT: 156 LBS

## 2025-05-23 DIAGNOSIS — M67.911 ROTATOR CUFF DYSFUNCTION, RIGHT: Primary | ICD-10-CM

## 2025-05-23 DIAGNOSIS — M25.511 ACUTE PAIN OF RIGHT SHOULDER: ICD-10-CM

## 2025-05-23 NOTE — PROGRESS NOTES
Orthopaedic Surgery - Office Note  Pepe Jenkins (34 y.o. male)   : 1990   MRN: [id number]  Encounter Date: 2025    Assessment & Plan  Rotator cuff dysfunction, right    Acute pain of right shoulder      Assessment / Plan     Partial supraspinatus tear, superior labral tear    Cervical strain with radiculopathy    MRA was reviewed with pt  At this point, recommend proceeding with shoulder arthroscopy with RCR and labral repair vs LHB tenodesis  Pt is interested in attempting cervical spine PT before shoulder surgery  Referral for spine PT was given  Continue with ice and analgesics/NSAIDs as needed for pain.  Work note: Continue with current work restrictions of sedentary duty / no use of right arm at this time.    Follow-up:  Return in about 1 month (around 2025).      Chief Complaint / Date of Onset  Right shoulder pain  Injury Mechanism / Date  Throwing item at work  Surgery / Date  None    History of Present Illness   Pepe Jenkins is a 34 y.o. male following up for f/u of his right shoulder.  He has been compliant with physical therapy which has helped him improve some swelling and slightly his range of motion.  Since starting to move it more he has had the feeling of instability with his shoulder slipping out the front.  He states that this is the same as a sensation he felt when the initial injury occurred.  After physical therapy he feels that his pain and swelling do increase for at least a day.  He has been on light duty at work which included sedentary work that did not involve his right arm. He had previously seen  including right subacromial steroid injection with short-lived benefit.  He has had symptoms for about 3 months with injury at work.  He lifted an item and threw it into another area with onset of symptoms with audible popping sound and a sensation of instability.  Today he continues to complain of generalized anterior and posterior shoulder pain.  He rates his pain  "from 8-10/10 with movement.  Any movement or leaning over aggravates while rest alleviates.  He has used Naproxen, Aleve and ASA for pain.  He has done physical therapy with increase of symptoms.  He denies past shoulder injections or surgery.   He works at [employer].  He is here today to review his shoulder MRA.    Treatment Summary  Medications / Modalities  Naproxen  Bracing / Immobilization  None  Physical Therapy  Yes, with increase of painand feeling of instability.  Physical therapist did notice a sulcus sign on exam.  Injections  Right subacromial CS, Genie  Prior Surgeries  None  Other Treatments  None    Employment / Current Status  [employer]     Sport / Organization / Current Status  N/A      Review of Systems  Pertinent items are noted in HPI.  All other systems were reviewed and are negative.      Physical Exam  Ht 5' 10\" (1.778 m)   Wt 70.8 kg (156 lb)   BMI 22.38 kg/m²   Cons: Appears well.  No apparent distress.  Psych: Alert. Oriented x3.  Mood and affect normal.  Eyes: PERRLA, EOMI  Resp: Normal effort.  No audible wheezing or stridor.  CV: Palpable pulse.  No discernable arrhythmia.  No LE edema.  Lymph:  No palpable cervical, axillary, or inguinal lymphadenopathy.  Skin: Warm.  No palpable masses.  No visible lesions.  Neuro: Normal muscle tone.  Normal and symmetric DTR's.     Right Shoulder Exam  Alignment / Posture:  Normal cervical alignment. Normal shoulder posture. Normal scapular position.  Inspection:  No swelling. No muscle atrophy. No deformity.  Palpation:  Moderate tenderness at anterior aspect of the shoulder/subacromial space.  ROM:  Shoulder FE 70 with feeling of instability past this both actively and passively. Shoulder ER 60 with feeling of instability past this. Shoulder IR SI joint on the right side.  Strength:  Supraspinatus 4/5. Infraspinatus 4/5. Subscapularis 5/5.  Stability:  No objective shoulder instability.  Tests: (+) Ramirez. (+) Austin. " (-) Spurling.  Neurovascular:  Sensation intact in Ax/R/M/U nerve distributions. Sensation intact in all digital nerve distributions. Fingers warm and perfused.       Studies Reviewed  Right shoulder MRI of 2/19/2025:  Superior labral lesion.  High-grade partial supraspinatus.  Right shoulder MRA - again shown partial supraspinatus tear and superior labral tear, unchanged from prior MRI      Procedures  No procedures today.    Medical, Surgical, Family, and Social History  The patient's medical history, family history, and social history, were reviewed and updated as appropriate.    Past Medical History:   Diagnosis Date    Asthma        Past Surgical History:   Procedure Laterality Date    FL INJECTION RIGHT SHOULDER (ARTHROGRAM)  5/20/2025       No family history on file.    Social History     Occupational History    Not on file   Tobacco Use    Smoking status: Never    Smokeless tobacco: Not on file   Vaping Use    Vaping status: Not on file   Substance and Sexual Activity    Alcohol use: Yes     Comment: socially     Drug use: Yes     Types: Marijuana    Sexual activity: Not on file       Allergies   Allergen Reactions    Pork (Porcine) Protein - Food Allergy Itching    Shellfish-Derived Products - Food Allergy Itching         Current Outpatient Medications:     naproxen (NAPROSYN) 250 mg tablet, Take 1 tablet (250 mg total) by mouth 3 (three) times a day with meals for 7 days, Disp: 21 tablet, Rfl: 0    lidocaine (LMX) 4 % cream, Apply topically as needed for moderate pain, Disp: 30 g, Rfl: 0    methocarbamol (ROBAXIN) 500 mg tablet, Take 1 tablet (500 mg total) by mouth 2 (two) times a day (Patient not taking: Reported on 4/30/2025), Disp: 20 tablet, Rfl: 0    methylPREDNISolone 4 MG tablet therapy pack, Use as directed on package, Disp: 21 tablet, Rfl: 0    predniSONE 10 mg tablet, Take 5 pills/day for 3 days, 4 pills/day for 3 days, 3 pills/day for 3 days, 2 pills/day for 3 days, one pill/day for 3 days  (Patient not taking: Reported on 4/30/2025), Disp: 45 tablet, Rfl: 0      Juni Gloria MD    Scribe Attestation      I,:   am acting as a scribe while in the presence of the attending physician.:       I,:   personally performed the services described in this documentation    as scribed in my presence.:

## 2025-05-23 NOTE — PROGRESS NOTES
"Orthopaedic Surgery - Office Note  Pepe Jenkins (34 y.o. male)   : 1990   MRN: [id number]  Encounter Date: 2025    Assessment / Plan  Partial supraspinatus tear, superior labral tear concern for frozen shoulder versus cervical strain with radiculopathy   {[facility] PLAN:27108}  Follow-up:  No follow-ups on file.      Chief Complaint / Date of Onset  ***  Injury Mechanism / Date  {NONE:56369::\"None\"}  Surgery / Date  {NONE:75378::\"None\"}    History of Present Illness   Pepe Jenkins is a 34 y.o. male who presents for ***    Treatment Summary  Medications / Modalities  {NONE:37526::\"None\"}  Bracing / Immobilization  {NONE:26996::\"None\"}  Physical Therapy  {NONE:45200::\"None\"}  Injections  {NONE:41874::\"None\"}  Prior Surgeries  {NONE:69992::\"None\"}  Other Treatments  {NONE:76157::\"None\"}    Employment / Current Status  ***    Sport / Organization / Current Status  ***      Review of Systems  {[facility] ROS COMPLETE:90996}      Physical Exam  Ht 5' 10\" (1.778 m)   Wt 70.8 kg (156 lb)   BMI 22.38 kg/m²   Cons: Appears well.  No apparent distress.  Psych: Alert. Oriented x3.  Mood and affect normal.  Eyes: PERRLA, EOMI  Resp: Normal effort.  No audible wheezing or stridor.  CV: Palpable pulse.  No discernable arrhythmia.  {PE EDEMA:00756::\"No LE edema.\"}  Lymph:  No palpable cervical, axillary, or inguinal lymphadenopathy.  Skin: Warm.  No palpable masses.  No visible lesions.  Neuro: Normal muscle tone.  Normal and symmetric DTR's.     ***      Studies Reviewed  {STUDIES REVIEWED:37162}      Procedures  {NO PROCDOC:93088}    Medical, Surgical, Family, and Social History  The patient's medical history, family history, and social history, were reviewed and updated as appropriate.    Past Medical History[1]    Past Surgical History[2]    Family History[3]    Social History     Occupational History    Not on file   Tobacco Use    Smoking status: Never    Smokeless tobacco: Not on file   Vaping Use    Vaping status: Not on " file   Substance and Sexual Activity    Alcohol use: Yes     Comment: socially     Drug use: Yes     Types: Marijuana    Sexual activity: Not on file       Allergies[4]    Current Medications[5]      Sana Smith    Scribe Attestation      I,:   am acting as a scribe while in the presence of the attending physician.:       I,:   personally performed the services described in this documentation    as scribed in my presence.:                 [1]   Past Medical History:  Diagnosis Date    Asthma    [2]   Past Surgical History:  Procedure Laterality Date    FL INJECTION RIGHT SHOULDER (ARTHROGRAM)  5/20/2025   [3] No family history on file.  [4]   Allergies  Allergen Reactions    Pork (Porcine) Protein - Food Allergy Itching    Shellfish-Derived Products - Food Allergy Itching   [5]   Current Outpatient Medications:     naproxen (NAPROSYN) 250 mg tablet, Take 1 tablet (250 mg total) by mouth 3 (three) times a day with meals for 7 days, Disp: 21 tablet, Rfl: 0    lidocaine (LMX) 4 % cream, Apply topically as needed for moderate pain, Disp: 30 g, Rfl: 0    methocarbamol (ROBAXIN) 500 mg tablet, Take 1 tablet (500 mg total) by mouth 2 (two) times a day (Patient not taking: Reported on 4/30/2025), Disp: 20 tablet, Rfl: 0    methylPREDNISolone 4 MG tablet therapy pack, Use as directed on package, Disp: 21 tablet, Rfl: 0    predniSONE 10 mg tablet, Take 5 pills/day for 3 days, 4 pills/day for 3 days, 3 pills/day for 3 days, 2 pills/day for 3 days, one pill/day for 3 days (Patient not taking: Reported on 4/30/2025), Disp: 45 tablet, Rfl: 0

## 2025-05-23 NOTE — LETTER
5/23/2025    Patient: Pepe Jenkins  YOB: 1990  Date of visit: 5/23/2025    To whom it may concern:    Pepe Jenkins is under my professional care and was seen in the office on 5/23/2025.  Work restrictions: No use of right arm.  Next MD evaluation in 1 month.    Please contact us if you have any questions.      Sincerely,      Juni Gloria MD

## 2025-06-02 ENCOUNTER — TELEPHONE (OUTPATIENT)
Age: 35
End: 2025-06-02

## 2025-06-11 ENCOUNTER — OFFICE VISIT (OUTPATIENT)
Dept: OBGYN CLINIC | Facility: OTHER | Age: 35
End: 2025-06-11
Payer: OTHER MISCELLANEOUS

## 2025-06-11 VITALS — BODY MASS INDEX: 22.33 KG/M2 | WEIGHT: 156 LBS | HEIGHT: 70 IN

## 2025-06-11 DIAGNOSIS — S46.011A TRAUMATIC INCOMPLETE TEAR OF RIGHT ROTATOR CUFF, INITIAL ENCOUNTER: Primary | ICD-10-CM

## 2025-06-11 DIAGNOSIS — S43.431A SUPERIOR GLENOID LABRUM LESION OF RIGHT SHOULDER, INITIAL ENCOUNTER: ICD-10-CM

## 2025-06-11 PROCEDURE — 99214 OFFICE O/P EST MOD 30 MIN: CPT | Performed by: ORTHOPAEDIC SURGERY

## 2025-06-11 RX ORDER — SODIUM CHLORIDE, SODIUM LACTATE, POTASSIUM CHLORIDE, CALCIUM CHLORIDE 600; 310; 30; 20 MG/100ML; MG/100ML; MG/100ML; MG/100ML
20 INJECTION, SOLUTION INTRAVENOUS CONTINUOUS
OUTPATIENT
Start: 2025-06-11

## 2025-06-11 RX ORDER — CHLORHEXIDINE GLUCONATE ORAL RINSE 1.2 MG/ML
15 SOLUTION DENTAL ONCE
OUTPATIENT
Start: 2025-06-11 | End: 2025-06-11

## 2025-06-11 NOTE — PROGRESS NOTES
Orthopaedic Surgery - Office Note  Pepe Jenkins (34 y.o. male)   : 1990   MRN: [id number]  Encounter Date: 2025    Assessment & Plan  Traumatic incomplete tear of right rotator cuff, initial encounter    Orders:    Case request operating room: shoulder arthroscopy, REPAIR LABRUM, possible biceps tenodesis, possible rotator cuff repair; Standing    Durable Medical Equipment    Superior glenoid labrum lesion of right shoulder, initial encounter    Orders:    Case request operating room: shoulder arthroscopy, REPAIR LABRUM, possible biceps tenodesis, possible rotator cuff repair; Standing    Durable Medical Equipment        Assessment / Plan    Partial supraspinatus tear, superior labral tear     Cervical strain with radiculopathy  The diagnosis and treatment options were reviewed.  The patient wishes to proceed with right shoulder arthroscopy with possible rotator cuff repair vs superior labral repair vs LHB tenodesis    Scheduled for 07/10/25  The risks, benefits, and alternatives were discussed.  Electronic consent was obtained.  Shoulder sling given during the visit, reminded to bring day of surgery  Continue with HEP/PT as tolerated  Avoid painful activity   Ice, heat and anti-inflammatories prn   Work note was provided   Follow-up:  Return for 3-4 day post op with Liam .    Chief Complaint / Date of Onset  Right shoulder pain  Injury Mechanism / Date  /Throwing item at work  Surgery / Date  None    History of Present Illness   Pepe Jenkins is a 34 y.o. male who presents for for f/u of his right shoulder.  He has been compliant with physical therapy which has helped him improve some swelling and slightly his range of motion.  Since starting to move it more he has had the feeling of instability with his shoulder slipping out the front.  He states that this is the same as a sensation he felt when the initial injury occurred.  After physical therapy he feels that his pain and swelling do increase  "for at least a day.  He has been on light duty at work which included sedentary work that did not involve his right arm. He had previously seen  including right subacromial steroid injection with short-lived benefit.  He has had symptoms for about 3 months with injury at work.  He lifted an item and threw it into another area with onset of symptoms with audible popping sound and a sensation of instability.  Today he continues to complain of generalized anterior and posterior shoulder pain.  He rates his pain from 8-10/10 with movement.  Any movement or leaning over aggravates while rest alleviates.  He has used Naproxen, Aleve and ASA for pain.  He has done physical therapy with increase of symptoms.  He denies past shoulder injections or surgery.   He works at [employer].     Treatment Summary  Medications / Modalities  Naproxen  Bracing / Immobilization  None  Physical Therapy  Yes, with increase of painand feeling of instability.     Injections  Right subacromial OZZIE, Genie; no relief   Prior Surgeries  None  Other Treatments  None     Employment / Current Status  [employer]      Sport / Organization / Current Status  N/A      Review of Systems  Pertinent items are noted in HPI.  All other systems were reviewed and are negative.      Physical Exam  Ht 5' 10\" (1.778 m)   Wt 70.8 kg (156 lb)   BMI 22.38 kg/m²   Cons: Appears well.  No apparent distress.  Psych: Alert. Oriented x3.  Mood and affect normal.  Eyes: PERRLA, EOMI  Resp: Normal effort.  No audible wheezing or stridor.  CV: Palpable pulse.  No discernable arrhythmia.  No LE edema.  Lymph:  No palpable cervical, axillary, or inguinal lymphadenopathy.  Skin: Warm.  No palpable masses.  No visible lesions.  Neuro: Normal muscle tone.  Normal and symmetric DTR's.     Right Shoulder Exam  Alignment / Posture:  Normal shoulder posture.  Inspection:  No swelling.  Palpation:  moderate anterior and lateral shoulder tenderness. " No effusion.  ROM:  Shoulder FE PROM 150. Shoulder ER 50. Shoulder IR L4. All motions increase pain   Strength:  Supraspinatus 4/5. Infraspinatus 4/5. Subscapularis 5/5.  Stability:  No objective shoulder instability.  Tests: (+) Ramirez. (+) [place]. (-) [place].  Neurovascular:  Sensation intact in Ax/R/M/U nerve distributions. 2+ radial pulse.       Studies Reviewed  I have personally reviewed pertinent films in PACS.  Right shoulder MRI of 2/19/2025:  Superior labral lesion.  High-grade partial supraspinatus.  Right shoulder MRA - again shown partial supraspinatus tear and superior labral tear, unchanged from prior MRI    Procedures  No procedures today.    Medical, Surgical, Family, and Social History  The patient's medical history, family history, and social history, were reviewed and updated as appropriate.    Past Medical History[1]    Past Surgical History[2]    Family History[3]    Social History     Occupational History    Not on file   Tobacco Use    Smoking status: Never    Smokeless tobacco: Not on file   Vaping Use    Vaping status: Not on file   Substance and Sexual Activity    Alcohol use: Yes     Comment: socially     Drug use: Yes     Types: Marijuana    Sexual activity: Not on file       Allergies[4]    Current Medications[5]      Pam Mccarthy    Scribe Attestation      I,:   am acting as a scribe while in the presence of the attending physician.:       I,:   personally performed the services described in this documentation    as scribed in my presence.:                [1]   Past Medical History:  Diagnosis Date    Asthma    [2]   Past Surgical History:  Procedure Laterality Date    FL INJECTION RIGHT SHOULDER (ARTHROGRAM)  5/20/2025   [3] No family history on file.  [4]   Allergies  Allergen Reactions    Pork (Porcine) Protein - Food Allergy Itching    Shellfish-Derived Products - Food Allergy Itching   [5]   Current Outpatient Medications:     naproxen (NAPROSYN) 250 mg tablet, Take 1 tablet  (250 mg total) by mouth 3 (three) times a day with meals for 7 days, Disp: 21 tablet, Rfl: 0    lidocaine (LMX) 4 % cream, Apply topically as needed for moderate pain, Disp: 30 g, Rfl: 0    methocarbamol (ROBAXIN) 500 mg tablet, Take 1 tablet (500 mg total) by mouth 2 (two) times a day (Patient not taking: Reported on 4/14/2025), Disp: 20 tablet, Rfl: 0    methylPREDNISolone 4 MG tablet therapy pack, Use as directed on package, Disp: 21 tablet, Rfl: 0    predniSONE 10 mg tablet, Take 5 pills/day for 3 days, 4 pills/day for 3 days, 3 pills/day for 3 days, 2 pills/day for 3 days, one pill/day for 3 days (Patient not taking: Reported on 4/30/2025), Disp: 45 tablet, Rfl: 0

## 2025-06-11 NOTE — ASSESSMENT & PLAN NOTE
Orders:    Case request operating room: shoulder arthroscopy, REPAIR LABRUM, possible biceps tenodesis, possible rotator cuff repair; Standing    Durable Medical Equipment

## 2025-06-11 NOTE — H&P (VIEW-ONLY)
Orthopaedic Surgery - Office Note  Pepe Jenkins (34 y.o. male)   : 1990   MRN: [id number]  Encounter Date: 2025    Assessment & Plan  Traumatic incomplete tear of right rotator cuff, initial encounter    Orders:    Case request operating room: shoulder arthroscopy, REPAIR LABRUM, possible biceps tenodesis, possible rotator cuff repair; Standing    Durable Medical Equipment    Superior glenoid labrum lesion of right shoulder, initial encounter    Orders:    Case request operating room: shoulder arthroscopy, REPAIR LABRUM, possible biceps tenodesis, possible rotator cuff repair; Standing    Durable Medical Equipment        Assessment / Plan    Partial supraspinatus tear, superior labral tear     Cervical strain with radiculopathy  The diagnosis and treatment options were reviewed.  The patient wishes to proceed with right shoulder arthroscopy with possible rotator cuff repair vs superior labral repair vs LHB tenodesis    Scheduled for 07/10/25  The risks, benefits, and alternatives were discussed.  Electronic consent was obtained.  Shoulder sling given during the visit, reminded to bring day of surgery  Continue with HEP/PT as tolerated  Avoid painful activity   Ice, heat and anti-inflammatories prn   Work note was provided   Follow-up:  Return for 3-4 day post op with Liam .    Chief Complaint / Date of Onset  Right shoulder pain  Injury Mechanism / Date  /Throwing item at work  Surgery / Date  None    History of Present Illness   Pepe Jenkins is a 34 y.o. male who presents for for f/u of his right shoulder.  He has been compliant with physical therapy which has helped him improve some swelling and slightly his range of motion.  Since starting to move it more he has had the feeling of instability with his shoulder slipping out the front.  He states that this is the same as a sensation he felt when the initial injury occurred.  After physical therapy he feels that his pain and swelling do increase  "for at least a day.  He has been on light duty at work which included sedentary work that did not involve his right arm. He had previously seen  including right subacromial steroid injection with short-lived benefit.  He has had symptoms for about 3 months with injury at work.  He lifted an item and threw it into another area with onset of symptoms with audible popping sound and a sensation of instability.  Today he continues to complain of generalized anterior and posterior shoulder pain.  He rates his pain from 8-10/10 with movement.  Any movement or leaning over aggravates while rest alleviates.  He has used Naproxen, Aleve and ASA for pain.  He has done physical therapy with increase of symptoms.  He denies past shoulder injections or surgery.   He works at [employer].     Treatment Summary  Medications / Modalities  Naproxen  Bracing / Immobilization  None  Physical Therapy  Yes, with increase of painand feeling of instability.     Injections  Right subacromial OZZIE, Genie; no relief   Prior Surgeries  None  Other Treatments  None     Employment / Current Status  [employer]      Sport / Organization / Current Status  N/A      Review of Systems  Pertinent items are noted in HPI.  All other systems were reviewed and are negative.      Physical Exam  Ht 5' 10\" (1.778 m)   Wt 70.8 kg (156 lb)   BMI 22.38 kg/m²   Cons: Appears well.  No apparent distress.  Psych: Alert. Oriented x3.  Mood and affect normal.  Eyes: PERRLA, EOMI  Resp: Normal effort.  No audible wheezing or stridor.  CV: Palpable pulse.  No discernable arrhythmia.  No LE edema.  Lymph:  No palpable cervical, axillary, or inguinal lymphadenopathy.  Skin: Warm.  No palpable masses.  No visible lesions.  Neuro: Normal muscle tone.  Normal and symmetric DTR's.     Right Shoulder Exam  Alignment / Posture:  Normal shoulder posture.  Inspection:  No swelling.  Palpation:  moderate anterior and lateral shoulder tenderness. " No effusion.  ROM:  Shoulder FE PROM 150. Shoulder ER 50. Shoulder IR L4. All motions increase pain   Strength:  Supraspinatus 4/5. Infraspinatus 4/5. Subscapularis 5/5.  Stability:  No objective shoulder instability.  Tests: (+) Ramirez. (+) [place]. (-) [place].  Neurovascular:  Sensation intact in Ax/R/M/U nerve distributions. 2+ radial pulse.       Studies Reviewed  I have personally reviewed pertinent films in PACS.  Right shoulder MRI of 2/19/2025:  Superior labral lesion.  High-grade partial supraspinatus.  Right shoulder MRA - again shown partial supraspinatus tear and superior labral tear, unchanged from prior MRI    Procedures  No procedures today.    Medical, Surgical, Family, and Social History  The patient's medical history, family history, and social history, were reviewed and updated as appropriate.    Past Medical History[1]    Past Surgical History[2]    Family History[3]    Social History     Occupational History    Not on file   Tobacco Use    Smoking status: Never    Smokeless tobacco: Not on file   Vaping Use    Vaping status: Not on file   Substance and Sexual Activity    Alcohol use: Yes     Comment: socially     Drug use: Yes     Types: Marijuana    Sexual activity: Not on file       Allergies[4]    Current Medications[5]      Pam Mccarthy    Scribe Attestation      I,:   am acting as a scribe while in the presence of the attending physician.:       I,:   personally performed the services described in this documentation    as scribed in my presence.:                [1]   Past Medical History:  Diagnosis Date    Asthma    [2]   Past Surgical History:  Procedure Laterality Date    FL INJECTION RIGHT SHOULDER (ARTHROGRAM)  5/20/2025   [3] No family history on file.  [4]   Allergies  Allergen Reactions    Pork (Porcine) Protein - Food Allergy Itching    Shellfish-Derived Products - Food Allergy Itching   [5]   Current Outpatient Medications:     naproxen (NAPROSYN) 250 mg tablet, Take 1 tablet  (250 mg total) by mouth 3 (three) times a day with meals for 7 days, Disp: 21 tablet, Rfl: 0    lidocaine (LMX) 4 % cream, Apply topically as needed for moderate pain, Disp: 30 g, Rfl: 0    methocarbamol (ROBAXIN) 500 mg tablet, Take 1 tablet (500 mg total) by mouth 2 (two) times a day (Patient not taking: Reported on 4/14/2025), Disp: 20 tablet, Rfl: 0    methylPREDNISolone 4 MG tablet therapy pack, Use as directed on package, Disp: 21 tablet, Rfl: 0    predniSONE 10 mg tablet, Take 5 pills/day for 3 days, 4 pills/day for 3 days, 3 pills/day for 3 days, 2 pills/day for 3 days, one pill/day for 3 days (Patient not taking: Reported on 4/30/2025), Disp: 45 tablet, Rfl: 0

## 2025-06-11 NOTE — LETTER
June 11, 2025     Patient: Pepe Jenkins  YOB: 1990  Date of Visit: 6/11/2025      To Whom it May Concern:    Pepe Jenkins is under my professional care. Pepe was seen in my office on 6/11/2025. He is undergoing surgical intervention on 7/10/25 and will be out of work for post operative recovery. We anticipate 6 months until full return to work. He may be able to return sooner with restrictions. He will follow up with him again 3-4 days post op     If you have any questions or concerns, please don't hesitate to call.          Sincerely,          Juni Gloria MD        CC: No Recipients

## 2025-06-12 ENCOUNTER — TELEPHONE (OUTPATIENT)
Age: 35
End: 2025-06-12

## 2025-06-12 NOTE — TELEPHONE ENCOUNTER
Caller: Judie/Delfin    Doctor: Dr. Gloria    Reason for call: Faxed 6/11 ovn/work status to [phone].      Call back#: [phone]

## 2025-06-30 ENCOUNTER — ANESTHESIA EVENT (OUTPATIENT)
Dept: PERIOP | Facility: HOSPITAL | Age: 35
End: 2025-06-30
Payer: OTHER MISCELLANEOUS

## 2025-06-30 NOTE — PRE-PROCEDURE INSTRUCTIONS
Pre-Surgery Instructions:   Medication Instructions    naproxen (NAPROSYN) 250 mg tablet Stop taking 3 days prior to surgery.    Medication instructions for day of surgery reviewed. Please take all instructed medications with only a sip of water. Please do not take any over the counter (non-prescribed) vitamins or supplements for one week prior to date of surgery.      You will receive a call one business day prior to surgery with an arrival time and hospital directions. If your surgery is scheduled on a Monday, the hospital will be calling you on the Friday prior to your surgery. If you have not heard from anyone by 8pm, please call the hospital supervisor through the hospital  at [phone]. ([place] [phone] or [place] [phone]).    Do not eat or drink anything after midnight the night before your surgery, including candy, mints, lifesavers, or chewing gum. Do not drink alcohol 24hrs before your surgery. Try not to smoke at least 24hrs before your surgery.       Follow the pre surgery showering instructions as listed in the “My Surgical Experience Booklet” or otherwise provided by your surgeon's office. Do not use a blade to shave the surgical area 1 week before surgery. It is okay to use a clean electric clippers up to 24 hours before surgery. Do not apply any lotions, creams, including makeup, cologne, deodorant, or perfumes after showering on the day of your surgery. Do not use dry shampoo, hair spray, hair gel, or any type of hair products.     No contact lenses, eye make-up, or artificial eyelashes. Remove nail polish, including gel polish, and any artificial, gel, or acrylic nails if possible. Remove all jewelry including rings and body piercing jewelry.     Wear causal clothing that is easy to take on and off. Consider your type of surgery.    Keep any valuables, jewelry, piercings at home. Please bring any specially ordered equipment (sling, braces) if indicated.    Arrange for a  responsible person to drive you to and from the hospital on the day of your surgery. Please confirm the visitor policy for the day of your procedure when you receive your phone call with an arrival time.     Call the surgeon's office with any new illnesses, exposures, or additional questions prior to surgery.    Please reference your “My Surgical Experience Booklet” for additional information to prepare for your upcoming surgery.

## 2025-07-10 ENCOUNTER — ANESTHESIA (OUTPATIENT)
Dept: PERIOP | Facility: HOSPITAL | Age: 35
End: 2025-07-10
Payer: OTHER MISCELLANEOUS

## 2025-07-10 ENCOUNTER — HOSPITAL ENCOUNTER (OUTPATIENT)
Facility: HOSPITAL | Age: 35
Setting detail: OUTPATIENT SURGERY
Discharge: HOME | End: 2025-07-10
Attending: ORTHOPAEDIC SURGERY | Admitting: ORTHOPAEDIC SURGERY
Payer: OTHER MISCELLANEOUS

## 2025-07-10 VITALS
HEART RATE: 77 BPM | WEIGHT: 162.26 LBS | RESPIRATION RATE: 16 BRPM | HEIGHT: 70 IN | OXYGEN SATURATION: 96 % | BODY MASS INDEX: 23.23 KG/M2 | DIASTOLIC BLOOD PRESSURE: 92 MMHG | SYSTOLIC BLOOD PRESSURE: 137 MMHG | TEMPERATURE: 98.5 F

## 2025-07-10 DIAGNOSIS — S46.011A TRAUMATIC INCOMPLETE TEAR OF RIGHT ROTATOR CUFF, INITIAL ENCOUNTER: ICD-10-CM

## 2025-07-10 DIAGNOSIS — S43.431A SUPERIOR GLENOID LABRUM LESION OF RIGHT SHOULDER, INITIAL ENCOUNTER: Primary | ICD-10-CM

## 2025-07-10 PROBLEM — F12.90 MARIJUANA USE: Status: ACTIVE | Noted: 2025-07-10

## 2025-07-10 PROCEDURE — 29827 SHO ARTHRS SRG RT8TR CUF RPR: CPT | Performed by: ORTHOPAEDIC SURGERY

## 2025-07-10 PROCEDURE — 23430 REPAIR BICEPS TENDON: CPT | Performed by: ORTHOPAEDIC SURGERY

## 2025-07-10 PROCEDURE — C1713 ANCHOR/SCREW BN/BN,TIS/BN: HCPCS | Performed by: ORTHOPAEDIC SURGERY

## 2025-07-10 DEVICE — FIBERTAK BICEPS IMPLANT SET
Type: IMPLANTABLE DEVICE | Site: SHOULDER | Status: FUNCTIONAL
Brand: ARTHREX®

## 2025-07-10 DEVICE — SWIVELOCK, SP BC KL 4.75MM
Type: IMPLANTABLE DEVICE | Site: SHOULDER | Status: FUNCTIONAL
Brand: ARTHREX®

## 2025-07-10 DEVICE — BIO-COMPSI CRKSCRW FT 4.5X 14MM
Type: IMPLANTABLE DEVICE | Site: SHOULDER | Status: FUNCTIONAL
Brand: ARTHREX®

## 2025-07-10 RX ORDER — SODIUM CHLORIDE, SODIUM LACTATE, POTASSIUM CHLORIDE, CALCIUM CHLORIDE 600; 310; 30; 20 MG/100ML; MG/100ML; MG/100ML; MG/100ML
20 INJECTION, SOLUTION INTRAVENOUS CONTINUOUS
Status: DISCONTINUED | OUTPATIENT
Start: 2025-07-10 | End: 2025-07-10 | Stop reason: HOSPADM

## 2025-07-10 RX ORDER — FENTANYL CITRATE/PF 50 MCG/ML
25 SYRINGE (ML) INJECTION
Status: DISCONTINUED | OUTPATIENT
Start: 2025-07-10 | End: 2025-07-10 | Stop reason: HOSPADM

## 2025-07-10 RX ORDER — NAPROXEN 500 MG/1
500 TABLET ORAL 2 TIMES DAILY WITH MEALS
Qty: 60 TABLET | Refills: 0 | Status: SHIPPED | OUTPATIENT
Start: 2025-07-10 | End: 2025-08-09

## 2025-07-10 RX ORDER — OXYCODONE HYDROCHLORIDE 10 MG/1
10 TABLET ORAL EVERY 4 HOURS PRN
Status: DISCONTINUED | OUTPATIENT
Start: 2025-07-10 | End: 2025-07-10 | Stop reason: HOSPADM

## 2025-07-10 RX ORDER — SENNOSIDES 8.6 MG
650 CAPSULE ORAL EVERY 6 HOURS SCHEDULED
Qty: 56 TABLET | Refills: 0 | Status: SHIPPED | OUTPATIENT
Start: 2025-07-10 | End: 2025-07-24

## 2025-07-10 RX ORDER — HYDROMORPHONE HCL/PF 1 MG/ML
0.5 SYRINGE (ML) INJECTION
Status: DISCONTINUED | OUTPATIENT
Start: 2025-07-10 | End: 2025-07-10 | Stop reason: HOSPADM

## 2025-07-10 RX ORDER — FENTANYL CITRATE 50 UG/ML
INJECTION, SOLUTION INTRAMUSCULAR; INTRAVENOUS
Status: COMPLETED | OUTPATIENT
Start: 2025-07-10 | End: 2025-07-10

## 2025-07-10 RX ORDER — CEFAZOLIN SODIUM 1 G/3ML
INJECTION, POWDER, FOR SOLUTION INTRAMUSCULAR; INTRAVENOUS AS NEEDED
Status: DISCONTINUED | OUTPATIENT
Start: 2025-07-10 | End: 2025-07-10

## 2025-07-10 RX ORDER — DEXAMETHASONE SODIUM PHOSPHATE 10 MG/ML
INJECTION, SOLUTION INTRAMUSCULAR; INTRAVENOUS AS NEEDED
Status: DISCONTINUED | OUTPATIENT
Start: 2025-07-10 | End: 2025-07-10

## 2025-07-10 RX ORDER — ONDANSETRON 4 MG/1
4 TABLET, FILM COATED ORAL EVERY 8 HOURS PRN
Qty: 20 TABLET | Refills: 0 | Status: SHIPPED | OUTPATIENT
Start: 2025-07-10

## 2025-07-10 RX ORDER — ONDANSETRON 2 MG/ML
INJECTION INTRAMUSCULAR; INTRAVENOUS AS NEEDED
Status: DISCONTINUED | OUTPATIENT
Start: 2025-07-10 | End: 2025-07-10

## 2025-07-10 RX ORDER — CEFAZOLIN SODIUM 2 G/50ML
2000 SOLUTION INTRAVENOUS ONCE
Status: DISCONTINUED | OUTPATIENT
Start: 2025-07-10 | End: 2025-07-10 | Stop reason: HOSPADM

## 2025-07-10 RX ORDER — BUPIVACAINE HYDROCHLORIDE 5 MG/ML
INJECTION, SOLUTION EPIDURAL; INTRACAUDAL; PERINEURAL
Status: COMPLETED | OUTPATIENT
Start: 2025-07-10 | End: 2025-07-10

## 2025-07-10 RX ORDER — ONDANSETRON 2 MG/ML
4 INJECTION INTRAMUSCULAR; INTRAVENOUS ONCE AS NEEDED
Status: DISCONTINUED | OUTPATIENT
Start: 2025-07-10 | End: 2025-07-10 | Stop reason: HOSPADM

## 2025-07-10 RX ORDER — ACETAMINOPHEN 325 MG/1
650 TABLET ORAL EVERY 6 HOURS PRN
Status: DISCONTINUED | OUTPATIENT
Start: 2025-07-10 | End: 2025-07-10 | Stop reason: HOSPADM

## 2025-07-10 RX ORDER — LIDOCAINE HYDROCHLORIDE 20 MG/ML
INJECTION, SOLUTION EPIDURAL; INFILTRATION; INTRACAUDAL; PERINEURAL AS NEEDED
Status: DISCONTINUED | OUTPATIENT
Start: 2025-07-10 | End: 2025-07-10

## 2025-07-10 RX ORDER — PROPOFOL 10 MG/ML
INJECTION, EMULSION INTRAVENOUS AS NEEDED
Status: DISCONTINUED | OUTPATIENT
Start: 2025-07-10 | End: 2025-07-10

## 2025-07-10 RX ORDER — OXYCODONE HYDROCHLORIDE 5 MG/1
5 TABLET ORAL EVERY 4 HOURS PRN
Qty: 15 TABLET | Refills: 0 | Status: SHIPPED | OUTPATIENT
Start: 2025-07-10 | End: 2025-07-14

## 2025-07-10 RX ORDER — MEPERIDINE HYDROCHLORIDE 25 MG/ML
12.5 INJECTION INTRAMUSCULAR; INTRAVENOUS; SUBCUTANEOUS
Status: DISCONTINUED | OUTPATIENT
Start: 2025-07-10 | End: 2025-07-10 | Stop reason: HOSPADM

## 2025-07-10 RX ORDER — SODIUM CHLORIDE, SODIUM LACTATE, POTASSIUM CHLORIDE, CALCIUM CHLORIDE 600; 310; 30; 20 MG/100ML; MG/100ML; MG/100ML; MG/100ML
125 INJECTION, SOLUTION INTRAVENOUS CONTINUOUS
Status: DISCONTINUED | OUTPATIENT
Start: 2025-07-10 | End: 2025-07-10 | Stop reason: HOSPADM

## 2025-07-10 RX ORDER — CHLORHEXIDINE GLUCONATE ORAL RINSE 1.2 MG/ML
15 SOLUTION DENTAL ONCE
Status: COMPLETED | OUTPATIENT
Start: 2025-07-10 | End: 2025-07-10

## 2025-07-10 RX ORDER — OXYCODONE HYDROCHLORIDE 5 MG/1
5 TABLET ORAL EVERY 4 HOURS PRN
Status: DISCONTINUED | OUTPATIENT
Start: 2025-07-10 | End: 2025-07-10 | Stop reason: HOSPADM

## 2025-07-10 RX ORDER — TRANEXAMIC ACID 10 MG/ML
1000 INJECTION, SOLUTION INTRAVENOUS ONCE
Status: COMPLETED | OUTPATIENT
Start: 2025-07-10 | End: 2025-07-10

## 2025-07-10 RX ORDER — MIDAZOLAM HYDROCHLORIDE 2 MG/2ML
INJECTION, SOLUTION INTRAMUSCULAR; INTRAVENOUS
Status: COMPLETED | OUTPATIENT
Start: 2025-07-10 | End: 2025-07-10

## 2025-07-10 RX ADMIN — LIDOCAINE HYDROCHLORIDE 100 MG: 20 INJECTION, SOLUTION EPIDURAL; INFILTRATION; INTRACAUDAL at 14:02

## 2025-07-10 RX ADMIN — PROPOFOL 250 MG: 10 INJECTION, EMULSION INTRAVENOUS at 14:02

## 2025-07-10 RX ADMIN — SODIUM CHLORIDE, SODIUM LACTATE, POTASSIUM CHLORIDE, AND CALCIUM CHLORIDE 125 ML/HR: .6; .31; .03; .02 INJECTION, SOLUTION INTRAVENOUS at 12:06

## 2025-07-10 RX ADMIN — TRANEXAMIC ACID 1000 MG: 10 INJECTION, SOLUTION INTRAVENOUS at 14:15

## 2025-07-10 RX ADMIN — BUPIVACAINE 10 ML: 13.3 INJECTION, SUSPENSION, LIPOSOMAL INFILTRATION at 13:23

## 2025-07-10 RX ADMIN — SODIUM CHLORIDE, SODIUM LACTATE, POTASSIUM CHLORIDE, AND CALCIUM CHLORIDE: .6; .31; .03; .02 INJECTION, SOLUTION INTRAVENOUS at 14:28

## 2025-07-10 RX ADMIN — FENTANYL CITRATE 50 MCG: 50 INJECTION INTRAMUSCULAR; INTRAVENOUS at 14:41

## 2025-07-10 RX ADMIN — FENTANYL CITRATE 50 MCG: 50 INJECTION INTRAMUSCULAR; INTRAVENOUS at 15:18

## 2025-07-10 RX ADMIN — CEFAZOLIN 2000 MG: 1 INJECTION, POWDER, FOR SOLUTION INTRAMUSCULAR; INTRAVENOUS at 14:13

## 2025-07-10 RX ADMIN — ONDANSETRON 4 MG: 2 INJECTION INTRAMUSCULAR; INTRAVENOUS at 14:58

## 2025-07-10 RX ADMIN — BUPIVACAINE HYDROCHLORIDE 10 ML: 5 INJECTION, SOLUTION EPIDURAL; INTRACAUDAL; PERINEURAL at 13:23

## 2025-07-10 RX ADMIN — FENTANYL CITRATE 100 MCG: 50 INJECTION INTRAMUSCULAR; INTRAVENOUS at 13:23

## 2025-07-10 RX ADMIN — DEXAMETHASONE SODIUM PHOSPHATE 4 MG: 10 INJECTION, SOLUTION INTRAMUSCULAR; INTRAVENOUS at 14:58

## 2025-07-10 RX ADMIN — MIDAZOLAM HYDROCHLORIDE 2 MG: 1 INJECTION, SOLUTION INTRAMUSCULAR; INTRAVENOUS at 13:21

## 2025-07-10 RX ADMIN — CHLORHEXIDINE GLUCONATE 15 ML: 1.2 SOLUTION ORAL at 11:58

## 2025-07-10 NOTE — ANESTHESIA POSTPROCEDURE EVALUATION
Post-Op Assessment Note    CV Status:  Stable  Pain Score: 0    Pain management: adequate       Mental Status:  Alert and awake   Hydration Status:  Euvolemic   PONV Controlled:  Controlled   Airway Patency:  Patent  Two or more mitigation strategies used for obstructive sleep apnea   Post Op Vitals Reviewed: Yes    No anethesia notable event occurred.    Staff: Anesthesiologist           Last Filed PACU Vitals:  Vitals Value Taken Time   Temp 97.7F    Pulse 80    /77 07/10/25 15:31   Resp 16    SpO2 97    Vitals shown include unfiled device data.    Modified Caryn:     Vitals Value Taken Time   Activity 2 07/10/25 15:59   Respiration 2 07/10/25 15:59   Circulation 2 07/10/25 15:59   Consciousness 2 07/10/25 15:59   Oxygen Saturation 2 07/10/25 15:59     Modified Caryn Score: 10

## 2025-07-10 NOTE — ANESTHESIA PROCEDURE NOTES
Peripheral Block    Patient location during procedure: holding area  Start time: 7/10/2025 1:21 PM  Reason for block: at surgeon's request and post-op pain management  Staffing  Performed by: Peter Clark DO  Authorized by: Peter Clark DO    Preanesthetic Checklist  Completed: patient identified, IV checked, site marked, risks and benefits discussed, surgical consent, monitors and equipment checked, pre-op evaluation and timeout performed  Peripheral Block  Patient position: supine  Prep: ChloraPrep  Patient monitoring: frequent blood pressure checks, continuous pulse oximetry and heart rate  Block type: Interscalene  Laterality: right  Injection technique: single-shot  Procedures: ultrasound guided, Ultrasound guidance required for the procedure to increase accuracy and safety of medication placement and decrease risk of complications.  Ultrasound permanent image saved  bupivacaine (PF) (MARCAINE) 0.5 % injection 20 mL - Perineural   10 mL - 7/10/2025 1:23:00 PM  bupivacaine liposomal (EXPAREL) 1.3 % injection 20 mL - Perineural   10 mL - 7/10/2025 1:23:00 PM  midazolam (VERSED) injection 0.5 mg - Intravenous   2 mg - 7/10/2025 1:21:00 PM  fentanyl citrate (PF) 100 MCG/2ML 50 mcg - Intravenous   100 mcg - 7/10/2025 1:23:00 PM  Needle  Needle type: Stimuplex   Needle gauge: 22 G  Needle length: 2 in  Needle localization: anatomical landmarks, ultrasound guidance and nerve stimulator  Assessment  Injection assessment: incremental injection, frequent aspiration, injected with ease, negative aspiration, negative for heart rate change, no paresthesia on injection, no symptoms of intraneural/intravenous injection and needle tip visualized at all times  Paresthesia pain: none  Post-procedure:  site cleaned  patient tolerated the procedure well with no immediate complications

## 2025-07-10 NOTE — INTERVAL H&P NOTE
H&P reviewed. After examining the patient I find no changes in the patients condition since the H&P had been written.    Vitals:    07/10/25 1144   BP: 131/95   Pulse: 84   Resp: 16   Temp: 97.6 °F (36.4 °C)   SpO2: 96%

## 2025-07-10 NOTE — OP NOTE
OPERATIVE REPORT    PATIENT NAME: Pepe Jenkins   :  1990  MRN: [id number]  Pt Location: [facility]OM 01    SURGERY DATE: 7/10/2025    SURGEON(S) and ROLE:  Primary: Juni Gloria MD  Assisting: Poornima Proctor PA-C; Genaro Valerio MD    NOTE:  I was present for the entire procedure and performed all essential portions of the surgery.      PREOPERATIVE DIAGNOSES:  Right Shoulder  Rotator Cuff Tear (supraspinatus)  Superior Labral Tear    POSTOPERATIVE DIAGNOSES:  Right Shoulder  Same as Preoperative Diagnoses    PROCEDURES:  Right Shoulder Arthroscopy with:  Rotator Cuff Repair (supraspinatus)  Open Subpectoral Biceps Tenodesis      ANESTHESIA TYPE:  General endotracheal and Interscalene block    ANESTHESIA STAFF:   Anesthesiologist: Peter Clark DO; Darius Connor MD  CRNA: Helena Giraldo CRNA    ESTIMATED BLOOD LOSS:  25 mL    PERIOPERATIVE ANTIBIOTICS:  cefazolin, 2 grams    IMPLANTS:  Ar[employer]rex Corkscrew 4.5 mm (x2), Swivelock 4.75 mm (x2), Biceps Fibertack    Implant Name Type Inv. Item Serial No.  Lot No. LRB No. Used Action   SYSTEM IMPLANT ANCHOR SLOT DRILL GUIDE FIBERTAK BICEPS OD1.9 MM - [id number]  SYSTEM IMPLANT ANCHOR SLOT DRILL GUIDE FIBERTAK BICEPS OD1.9 MM  [employer] [id number] Right 1 Implanted   ANCHOR CORKSCREW FIBERWIRE L14 MM BIOCOMPOSITE 2 FULL THREAD VENT SUTURE - [id number]  ANCHOR CORKSCREW FIBERWIRE L14 MM BIOCOMPOSITE 2 FULL THREAD VENT SUTURE  [employer] [id number] Right 1 Implanted   ANCHOR SWIVELOCK L24.5 MM BIOCOMPOSITE PEEK 2 KNOTLESS SELF PUNCH EYELET SUTURE BLUE - [id number]  ANCHOR SWIVELOCK L24.5 MM BIOCOMPOSITE PEEK 2 KNOTLESS SELF PUNCH EYELET SUTURE BLUE  [employer] [id number] Right 1 Implanted   ANCHOR CORKSCREW FIBERWIRE L14 MM BIOCOMPOSITE 2 FULL THREAD VENT SUTURE - [id number]  ANCHOR CORKSCREW FIBERWIRE L14 MM BIOCOMPOSITE 2 FULL THREAD VENT SUTURE  [employer] [id number] Right 1 Implanted   ANCHOR SWIVELOCK L24.5 MM BIOCOMPOSITE PEEK 2  KNOTLESS SELF PUNCH EYELET SUTURE BLUE - [id number]  ANCHOR SWIVELOCK L24.5 MM BIOCOMPOSITE PEEK 2 KNOTLESS SELF PUNCH EYELET SUTURE BLUE  [employer] [id number] Right 1 Implanted       SPECIMENS:  * No specimens in log *      OPERATIVE INDICATIONS:  The patient is a 34 y.o. male with right shoulder pain and a rotator cuff tear and superior labral tear.  Surgical treatment was indicated due to persistent symptoms despite non-surgical treatment.  After a thorough discussion of the potential risks, benefits, and alternative treatments, the patient agreed to proceed with surgery.  The patient understands that the risks of surgery include, but are not limited to: failure of repair, infection, neurovascular injury, wound healing complications, venous thromboembolism, persistent pain, stiffness, instability, recurrence of symptoms, potential need for additional surgeries, and loss of limb or life.  Oral and written consent for surgery was obtained from the patient preoperatively.    EXAM UNDER ANESTHESIA:  Operative shoulder:   FE-170   ER-80   AER-90   AIR-80    PROCEDURE AND TECHNIQUE:  On the day of surgery, the patient was identified in the preoperative holding area.  The operative site was marked by the surgeon.  The patient was taken into the operating room.  A time-out was conducted to confirm the patient's identity, the operative site, and the proposed procedure.  The patient was anesthetized, and perioperative antibiotics were administered.  The patient was positioned beach chair on the OSI frame.  All bony prominences were padded.  The operative site was prepped and draped using standard sterile technique.      A posterior portal was established, and the arthroscope was placed into the glenohumeral joint.  An anterosuperior portal was established through the rotator interval.  Diagnostic arthroscopy was performed.  The glenohumeral joint demonstrated marked synovitis which was debrided with a motorized shaver.   The glenoid demonstrated pristine articular cartilage.  The humeral head demonstrated pristine articular cartilage.    The long head of the biceps tendon was intact, without inflammation or tearing.  The superior labrum had a Type 2 tear from 10 o'clock posterosuperior to 2 o'clock anterosuperior.  The biceps tendon was released from the superior labrum. The superior labrum was debrided with a motorized shaver to remove all unstable tissue. An open subpectoral biceps tenodesis was performed. An incision was made just lateral to the axillary crease centered over the inferior border of the pectoralis major tendon.  The biceps tendon was identified in the biciptial groove and delivered into the wound.  The biciptial groove was prepared with a rasp, and the tenodesis was secured with a Biceps FiberTack.  The repair demonstrated appropriate soft tissue tension and excellent stability through a full elbow range of motion.    The posterior capsulolabral complex  was intact.  The anterior capsulolabral complex was intact.  .    The subscapularis tendon was intact.  .    The posterosuperior rotator cuff had a partial-thickness bursal side tear > 50% supraspinatus measuring 1 cm anterior to posterior.  The subacromial bursa was inflamed.  A thorough subacromial bursectomy was performed through a lateral portal.  The rotator cuff was debrided to remove the torn and degenerative tissue. The greater tuberosity was prepared to a bleeding bone bed with a mitch. The rotator cuff was repaired with a double row suture bridge construct using two 4.5mm Corkscrew (Arthrex) and two 4.75mm Swivelock (Arth[employer]) placed in the greater tuberosity.  The tendon was fixed securely to its footprint without undue tension, and the repair was stable with gentle shoulder rotation. There was a Type 1 acromion, and acromioplasty was not performed.      At the conclusion of the procedure, the instruments were withdrawn.  The portals and incisions were  closed with absorbable sutures and steri-strips.  A sterile dressing was applied.  The surgical drapes were removed.  The operative arm was immobilized in a sling with abduction pillow.  The patient was awakened from anesthesia and transported to the PACU in stable condition.      COMPLICATIONS:  None    PATIENT DISPOSITION:  PACU       SIGNATURE:  Juni Gloria MD  DATE:  July 10, 2025  TIME:  5:45 PM

## 2025-07-10 NOTE — ANESTHESIA PREPROCEDURE EVALUATION
Procedure:  shoulder arthroscopy, REPAIR LABRUM, possible biceps tenodesis, possible rotator cuff repair (Right: Shoulder)    Relevant Problems   PULMONARY   (+) Asthma      Behavioral Health   (+) Marijuana use      Rheumatology   (+) Superior glenoid labrum lesion of right shoulder, initial encounter      Orthopedic/Musculoskeletal   (+) Traumatic incomplete tear of right rotator cuff, initial encounter        Physical Exam    Airway   Comment: + facial hair  Mallampati score: I  TM Distance: <3 FB    Mouth opening: >= 4 cm      Cardiovascular  Rhythm: regular, Rate: normal    Dental        Pulmonary  Pulmonary exam normal     Neurological    He appears awake, alert and oriented x3.      Other Findings        Anesthesia Plan  ASA Score- 2     Anesthesia Type- general with ASA Monitors.         Additional Monitors:     Airway Plan: LMA, Oral ETT and LMA.    Comment: GA + ISB.       Plan Factors-Exercise tolerance (METS): >4 METS.    Chart reviewed.   Existing labs reviewed.     Patient is a current smoker.  Patient instructed to abstain from smoking on day of procedure. Patient did not smoke on day of surgery (marijuana last night).    Obstructive sleep apnea risk education given perioperatively.        Induction- intravenous.    Postoperative Plan- Plan for postoperative opioid use.   Monitoring Plan - Monitoring plan - standard ASA monitoring  Post Operative Pain Plan - non-opiod analgesics, plan for postoperative opioid use, peripheral nerve block and multimodal analgesia        Informed Consent- Anesthetic plan and risks discussed with patient.        NPO Status:  Vitals Value Taken Time   Date of last liquid 07/10/25 07/10/25 11:49   Time of last liquid 0900 07/10/25 11:49   Date of last solid 07/09/25 07/10/25 11:49   Time of last solid 2100 07/10/25 11:49

## 2025-07-10 NOTE — DISCHARGE INSTR - AVS FIRST PAGE
POSTOPERATIVE INSTRUCTIONS following SHOULDER SURGERY    MEDICATIONS:  Resume all home medications unless otherwise instructed by your surgeon.  Pain Medication:  Oxycodone 5 mg, 1-2 tablets every 4 hours as needed  If you were given a regional anesthetic (nerve block), please begin taking the pain medication as soon as you get home, even if you have minimal or no pain.  DO NOT WAIT FOR THE NERVE BLOCK TO WEAR OFF.  Possible side effects include nausea, constipation, and urinary retention.  If you experience these side effects, please call our office for assistance.  Pain med refills are authorized only during office hours (8am-4pm, Mon-Fri).  Anti-Inflammatory:  Tylenol 650 mg, 1 tablet every 6 hours and Naproxen 500 mg, 1 tablet every 12 hours  TAKE WITH FOOD.  Stop if you experience nausea, reflux, or stomach pain.  Nausea Medication:  Zofran ODT 4 mg, 1 tablet every 6 hours as needed  Fill prescription ONLY if you expericnce severe nausea.    WOUND CARE:  Keep the dressing clean and dry.  Light drainage may occur the first 2 days postop.  You may remove the dressings and get the incision wet in the shower 72 hours after surgery.  DO NOT remove steri-strips or sutures.  DO NOT immerse the incision under water.  Carefully pat the incision dry.  If there is wound drainage, re-apply a fresh dry gauze dressing.  Please call our office ([phone]) if you experience either of the following:  Sudden increase in swelling, redness, or warmth at the surgical site  Excessive incisional drainage that persists beyond the 3rd day after surgery  Oral temperature greater than 101 degrees, not relieved with Tylenol  Shortness of breath, chest pain, nausea, or any other concerning symptoms    SWELLING CONTROL:  Cold Therapy:  The cold therapy device may be used either continuously or only as needed, according to your preference.  Do not let the pad directly touch your skin.  Alternatively, apply ice (20 min on, 20 min off) as  often as you feel is necessary.    SLING:  Wear your sling AT ALL TIMES (including sleep) until your first postoperative office visit.  You may remove the sling for showering but must keep your arm at your side.    ACTIVITY:   DO NOT lift, carry, push, or pull anything with your operative arm.  Shoulder:  DO NOT actively (on your own) raise your operative arm away from the side of you body or rotate it out away from your body unless instructed by your surgeon or physical therapist.  Place a pillow behind the elbow while lying down.  Sleeping in a more upright position (recliner) may be more comfortable initially.  Wrist / Finger Motion:  With the sling on, move your wrist and fingers through a full range of motion 20 times per hour while awake.    PHYSICAL THERAPY:  Begin therapy 5 TO 7 DAYS AFTER SURGERY.  You were given a prescription for therapy at your preoperative office visit.  If you do not have physical therapy scheduled yet, please call our office for assistance.    FOLLOW-UP APPOINTMENT:  4-5 days after surgery with:    Dr. Juni Gloria MD  [facility]  [address] [place] [address], [address] [place] [address]  [phone]

## 2025-07-14 ENCOUNTER — OFFICE VISIT (OUTPATIENT)
Dept: OBGYN CLINIC | Facility: MEDICAL CENTER | Age: 35
End: 2025-07-14

## 2025-07-14 VITALS — BODY MASS INDEX: 23.34 KG/M2 | HEIGHT: 70 IN | WEIGHT: 163 LBS

## 2025-07-14 DIAGNOSIS — S43.431A SUPERIOR GLENOID LABRUM LESION OF RIGHT SHOULDER, INITIAL ENCOUNTER: Primary | ICD-10-CM

## 2025-07-14 PROCEDURE — 99024 POSTOP FOLLOW-UP VISIT: CPT | Performed by: ORTHOPAEDIC SURGERY

## 2025-07-14 RX ORDER — HYDROCODONE BITARTRATE AND ACETAMINOPHEN 5; 325 MG/1; MG/1
1 TABLET ORAL EVERY 6 HOURS PRN
Qty: 15 TABLET | Refills: 0 | Status: SHIPPED | OUTPATIENT
Start: 2025-07-14

## 2025-07-14 NOTE — ASSESSMENT & PLAN NOTE
Continue with sling and pillow for the next 4 weeks  Can take off sling for gentle elbow range of motion and pendulum swings.  Can shower allowing water to run over the incision portals. No submerging portal incisions in water until healed.  Start outpatient physical therapy  Continue with tylenol and naproxen  Hydrocodone sent to patient's pharmacy.  Follow-up:  Return in about 5 weeks (around 8/18/2025).

## 2025-07-14 NOTE — PROGRESS NOTES
Orthopaedic Surgery - Office Note  Pepe Jenkins (34 y.o. male)   : 1990   MRN: [id number]  Encounter Date: 2025    Assessment / Plan  S/p right shoulder arthroscopy with rotator cuff repair (supraspinatus) and open subpectoral biceps tenodesis on 7/10/2025  Assessment & Plan  Superior glenoid labrum lesion of right shoulder, initial encounter    Continue with sling and pillow for the next 4 weeks  Can take off sling for gentle elbow range of motion and pendulum swings.  Can shower allowing water to run over the incision portals. No submerging portal incisions in water until healed.  Start outpatient physical therapy  Continue with tylenol and naproxen  Hydrocodone sent to patient's pharmacy.  Follow-up:  Return in about 5 weeks (around 2025).         Chief Complaint / Date of Onset  Right shoulder pain  Injury Mechanism / Date  /Throwing item at work  Surgery / Date  S/p right shoulder arthroscopy with rotator cuff repair and open subpectoral biceps tenodesis on 7/10/2025    History of Present Illness   Pepe Jenkins is a 34 y.o. male who presents for follow up s/p right shoulder arthroscopy with rotator cuff repair (supraspinatus) and open subpectoral biceps tenodesis on 7/10/2025. He states he is doing well overall. He reports having significant pain in the right shoulder. He has been taking tylenol and naproxen for his pain. He reports his pharmacy has not received his prescription for Oxycodone. He has been compliant with the use of his sling and pillow.     Treatment Summary  Medications / Modalities  Tylenol and Naproxen  Bracing / Immobilization  Sling with pillow  Physical Therapy  Yes, with increase of painand feeling of instability  Injections  Right subacromial CS, Genie; no relief   Prior Surgeries  None  Other Treatments  None    Employment / Current Status  [employer]    Sport / Organization / Current Status  N/A      Review of Systems  Pertinent items are noted in  "HPI.  All other systems were reviewed and are negative.      Physical Exam  Ht 5' 10\" (1.778 m)   Wt 73.9 kg (163 lb)   BMI 23.39 kg/m²   Cons: Appears well.  No apparent distress.  Psych: Alert. Oriented x3.  Mood and affect normal.  Eyes: PERRLA, EOMI  Resp: Normal effort.  No audible wheezing or stridor.  CV: Palpable pulse.  No discernable arrhythmia.  No LE edema.  Lymph:  No palpable cervical, axillary, or inguinal lymphadenopathy.  Skin: Warm.  No palpable masses.  No visible lesions.  Neuro: Normal muscle tone.  Normal and symmetric DTR's.     Right Shoulder Exam  Alignment / Posture:  Normal shoulder posture.  Inspection:  mild swelling. No erythema. Incision clean and dry.  Palpation:  diffuse tenderness.  ROM:  Not tested.  Strength:  Not tested.  Stability:  Not tested.  Tests: No pertinent positive or negative tests.  Neurovascular:  Sensation intact in Ax/R/M/U nerve distributions. 2+ radial pulse.       Studies Reviewed  No studies to review      Procedures  No procedures today.    Medical, Surgical, Family, and Social History  The patient's medical history, family history, and social history, were reviewed and updated as appropriate.    Past Medical History[1]    Past Surgical History[2]    Family History[3]    Social History     Occupational History    Not on file   Tobacco Use    Smoking status: Never    Smokeless tobacco: Not on file   Vaping Use    Vaping status: Some Days    Substances: THC   Substance and Sexual Activity    Alcohol use: Not Currently     Comment: socially . drinks rum 3-4 shots on fridays    Drug use: Yes     Types: Marijuana    Sexual activity: Not on file       Allergies[4]    Current Medications[5]      Sher Gramajo    Scribe Attestation      I,:  Sher Gramajo am acting as a scribe while in the presence of the attending physician.:       I,:  Juni Gloria MD personally performed the services described in this documentation    as scribed in my presence.:  "                  [1]   Past Medical History:  Diagnosis Date    Asthma    [2]   Past Surgical History:  Procedure Laterality Date    FL INJECTION RIGHT SHOULDER (ARTHROGRAM)  5/20/2025    IA SURGICAL ARTHROSCOPY SHOULDER W/ROTATOR CUFF RPR Right 7/10/2025    Procedure: REPAIR ROTATOR CUFF  ARTHROSCOPIC;  Surgeon: Juni Gloria MD;  Location: [facility];  Service: Orthopedics    IA TENODESIS LONG TENDON BICEPS Right 7/10/2025    Procedure: TENODESIS BICEPS OPEN PROXIMAL;  Surgeon: Juni Gloria MD;  Location: [facility];  Service: Orthopedics   [3] No family history on file.  [4]   Allergies  Allergen Reactions    Pork (Porcine) Protein - Food Allergy Itching    Shellfish-Derived Products - Food Allergy Itching   [5]   Current Outpatient Medications:     acetaminophen (TYLENOL) 650 mg CR tablet, Take 1 tablet (650 mg total) by mouth every 6 (six) hours for 14 days, Disp: 56 tablet, Rfl: 0    naproxen (NAPROSYN) 500 mg tablet, Take 1 tablet (500 mg total) by mouth 2 (two) times a day with meals, Disp: 60 tablet, Rfl: 0    lidocaine (LMX) 4 % cream, Apply topically as needed for moderate pain, Disp: 30 g, Rfl: 0    ondansetron (ZOFRAN) 4 mg tablet, Take 1 tablet (4 mg total) by mouth every 8 (eight) hours as needed for nausea or vomiting (Patient not taking: Reported on 7/14/2025), Disp: 20 tablet, Rfl: 0    oxyCODONE (ROXICODONE) 5 immediate release tablet, Take 1 tablet (5 mg total) by mouth every 4 (four) hours as needed for moderate pain for up to 15 doses Max Daily Amount: 30 mg (Patient not taking: Reported on 7/14/2025), Disp: 15 tablet, Rfl: 0

## 2025-07-30 ENCOUNTER — TELEPHONE (OUTPATIENT)
Dept: OBGYN CLINIC | Facility: HOSPITAL | Age: 35
End: 2025-07-30

## 2025-08-05 ENCOUNTER — TELEPHONE (OUTPATIENT)
Dept: OBGYN CLINIC | Facility: HOSPITAL | Age: 35
End: 2025-08-05

## 2025-08-06 DIAGNOSIS — S43.431A SUPERIOR GLENOID LABRUM LESION OF RIGHT SHOULDER, INITIAL ENCOUNTER: ICD-10-CM

## 2025-08-06 DIAGNOSIS — S46.011A TRAUMATIC INCOMPLETE TEAR OF RIGHT ROTATOR CUFF, INITIAL ENCOUNTER: ICD-10-CM

## 2025-08-06 RX ORDER — NAPROXEN 500 MG/1
500 TABLET ORAL 2 TIMES DAILY WITH MEALS
Qty: 60 TABLET | Refills: 0 | Status: SHIPPED | OUTPATIENT
Start: 2025-08-06

## (undated) DEVICE — SUCTION MAT (LOW PROFILE), 50X34: Brand: NEPTUNE

## (undated) DEVICE — BETHLEHEM TOTAL HIP, KIT: Brand: CARDINAL HEALTH

## (undated) DEVICE — Device

## (undated) DEVICE — SUT STRATAFIX SPIRAL MONOCRYL PLUS 4-0 PS-2 30CM SXMP1B117

## (undated) DEVICE — ABDOMINAL PAD: Brand: DERMACEA

## (undated) DEVICE — IMPERVIOUS STOCKINETTE: Brand: DEROYAL

## (undated) DEVICE — GAUZE SPONGES,16 PLY: Brand: CURITY

## (undated) DEVICE — 4-PORT MANIFOLD: Brand: NEPTUNE 2

## (undated) DEVICE — 3M™ STERI-DRAPE™ U-DRAPE 1015: Brand: STERI-DRAPE™

## (undated) DEVICE — ASTOUND STANDARD SURGICAL GOWN, XL: Brand: CONVERTORS

## (undated) DEVICE — KERLIX BANDAGE ROLL: Brand: KERLIX

## (undated) DEVICE — ANTIBACTERIAL UNDYED BRAIDED (POLYGLACTIN 910), SYNTHETIC ABSORBABLE SUTURE: Brand: COATED VICRYL

## (undated) DEVICE — KENDALL SCD SEQUENTIAL COMPRESSION COMFORT SLEEVES, KNEE LENGTH, MEDIUM: Brand: KENDALL SCD

## (undated) DEVICE — INTENDED FOR TISSUE SEPARATION, AND OTHER PROCEDURES THAT REQUIRE A SHARP SURGICAL BLADE TO PUNCTURE OR CUT.: Brand: BARD-PARKER ® CARBON RIB-BACK BLADES

## (undated) DEVICE — SUT MONOCRYL 2-0 SH 27 IN Y417H

## (undated) DEVICE — 3M™ STERI-STRIP™ REINFORCED ADHESIVE SKIN CLOSURES, R1547, 1/2 IN X 4 IN (12 MM X 100 MM), 6 STRIPS/ENVELOPE: Brand: 3M™ STERI-STRIP™